# Patient Record
Sex: FEMALE | Race: WHITE | NOT HISPANIC OR LATINO | Employment: UNEMPLOYED | URBAN - METROPOLITAN AREA
[De-identification: names, ages, dates, MRNs, and addresses within clinical notes are randomized per-mention and may not be internally consistent; named-entity substitution may affect disease eponyms.]

---

## 2023-07-26 ENCOUNTER — HOSPITAL ENCOUNTER (OUTPATIENT)
Dept: DATA CONVERSION | Facility: HOSPITAL | Age: 38
End: 2023-07-26
Attending: RADIOLOGY
Payer: COMMERCIAL

## 2023-07-26 DIAGNOSIS — M21.851 OTHER SPECIFIED ACQUIRED DEFORMITIES OF RIGHT THIGH: ICD-10-CM

## 2023-07-26 DIAGNOSIS — S73.191A OTHER SPRAIN OF RIGHT HIP, INITIAL ENCOUNTER: ICD-10-CM

## 2023-07-26 DIAGNOSIS — Z98.890 OTHER SPECIFIED POSTPROCEDURAL STATES: ICD-10-CM

## 2023-10-09 PROBLEM — Q65.89 FEMORAL ANTEVERSION, CONGENITAL (HHS-HCC): Status: ACTIVE | Noted: 2023-10-09

## 2023-10-09 PROBLEM — M70.61 TROCHANTERIC BURSITIS OF BOTH HIPS: Status: ACTIVE | Noted: 2020-05-18

## 2023-10-09 PROBLEM — M51.26 HERNIATED LUMBAR INTERVERTEBRAL DISC: Status: ACTIVE | Noted: 2019-02-28

## 2023-10-09 PROBLEM — N60.11 FIBROCYSTIC BREAST CHANGES OF BOTH BREASTS: Status: ACTIVE | Noted: 2023-04-12

## 2023-10-09 PROBLEM — M46.1 SACROILIITIS (CMS-HCC): Status: ACTIVE | Noted: 2020-05-18

## 2023-10-09 PROBLEM — N60.12 FIBROCYSTIC BREAST CHANGES OF BOTH BREASTS: Status: ACTIVE | Noted: 2023-04-12

## 2023-10-09 PROBLEM — G90.A POTS (POSTURAL ORTHOSTATIC TACHYCARDIA SYNDROME): Status: ACTIVE | Noted: 2021-06-09

## 2023-10-09 PROBLEM — R92.30 DENSE BREASTS: Status: ACTIVE | Noted: 2023-04-12

## 2023-10-09 PROBLEM — M25.859 FEMOROACETABULAR IMPINGEMENT: Status: ACTIVE | Noted: 2020-07-22

## 2023-10-09 PROBLEM — M62.838 MUSCLE SPASM: Status: ACTIVE | Noted: 2020-04-20

## 2023-10-09 PROBLEM — F10.20 ALCOHOL DEPENDENCE (MULTI): Status: ACTIVE | Noted: 2023-10-09

## 2023-10-09 PROBLEM — G58.8 PUDENDAL NEURALGIA: Status: ACTIVE | Noted: 2020-05-18

## 2023-10-09 PROBLEM — N64.4 MASTODYNIA: Status: ACTIVE | Noted: 2023-04-12

## 2023-10-09 PROBLEM — Z98.1 S/P LUMBAR SPINAL FUSION: Status: ACTIVE | Noted: 2020-04-20

## 2023-10-09 PROBLEM — R87.810 CERVICAL HIGH RISK HPV (HUMAN PAPILLOMAVIRUS) TEST POSITIVE: Status: ACTIVE | Noted: 2020-12-18

## 2023-10-09 PROBLEM — F32.2 MDD (MAJOR DEPRESSIVE DISORDER), SEVERE (MULTI): Status: ACTIVE | Noted: 2022-12-22

## 2023-10-09 PROBLEM — M70.62 TROCHANTERIC BURSITIS OF BOTH HIPS: Status: ACTIVE | Noted: 2020-05-18

## 2023-10-09 PROBLEM — M25.851 FEMOROACETABULAR IMPINGEMENT OF RIGHT HIP: Status: ACTIVE | Noted: 2020-07-19

## 2023-10-09 PROBLEM — F98.8 ATTENTION DEFICIT DISORDER (ADD) WITHOUT HYPERACTIVITY: Status: ACTIVE | Noted: 2020-05-06

## 2023-10-09 PROBLEM — M48.061 LUMBAR STENOSIS: Status: ACTIVE | Noted: 2020-02-10

## 2023-10-09 PROBLEM — R92.2 DENSE BREASTS: Status: ACTIVE | Noted: 2023-04-12

## 2023-10-09 RX ORDER — METFORMIN HYDROCHLORIDE 1000 MG/1
1000 TABLET ORAL
COMMUNITY

## 2023-10-09 RX ORDER — NALTREXONE HYDROCHLORIDE 50 MG/1
50 TABLET, FILM COATED ORAL DAILY
COMMUNITY
Start: 2023-07-06 | End: 2023-10-10 | Stop reason: ALTCHOICE

## 2023-10-09 RX ORDER — MELOXICAM 7.5 MG/1
7.5 TABLET ORAL DAILY PRN
COMMUNITY
End: 2023-10-10 | Stop reason: ALTCHOICE

## 2023-10-09 RX ORDER — METHOCARBAMOL 750 MG/1
750 TABLET, FILM COATED ORAL EVERY 8 HOURS PRN
COMMUNITY
End: 2023-10-10 | Stop reason: ALTCHOICE

## 2023-10-09 RX ORDER — FAMOTIDINE 20 MG/1
20 TABLET, FILM COATED ORAL DAILY PRN
COMMUNITY

## 2023-10-09 RX ORDER — BUPROPION HYDROCHLORIDE 150 MG/1
150 TABLET ORAL
COMMUNITY
Start: 2023-06-26

## 2023-10-10 ENCOUNTER — TELEMEDICINE (OUTPATIENT)
Dept: PAIN MEDICINE | Facility: HOSPITAL | Age: 38
End: 2023-10-10
Payer: COMMERCIAL

## 2023-10-10 DIAGNOSIS — M96.1 FAILED BACK SYNDROME OF LUMBAR SPINE: Primary | ICD-10-CM

## 2023-10-10 DIAGNOSIS — M25.851 FEMOROACETABULAR IMPINGEMENT OF RIGHT HIP: ICD-10-CM

## 2023-10-10 PROCEDURE — 99204 OFFICE O/P NEW MOD 45 MIN: CPT | Performed by: ANESTHESIOLOGY

## 2023-10-10 PROCEDURE — 99214 OFFICE O/P EST MOD 30 MIN: CPT | Mod: GT | Performed by: ANESTHESIOLOGY

## 2023-10-10 ASSESSMENT — PAIN SCALES - GENERAL: PAINLEVEL: 8

## 2023-10-10 NOTE — PROGRESS NOTES
Chief Complaint   Patient presents with    Back Pain    Leg Pain    Hip Pain        HPI   David Street is a patient with a history of back pain and hip pain which has been standing.  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  She notes that she has had a total of 4 spine surgeries with Dr. Solis at [facility] which included 3 laminectomies and a L5-S1 fusion by her report.  The patient says that she has had 8 total hip surgeries the most recent of which was an arthroscopy at the end of last year.  She says that she has chronic pain which is triggering this appointment today.  When I asked about what acute means and how long her acute pain has been going, she says the last 3 years.  The patient notes that she has seen pain management and at one point was seeing Dr. 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  She underwent lumbar sympathetic blocks and did physical therapy which improved her CRPS symptoms.  Her acute on chronic pain includes her bilateral hips which is consistent with hip pain generating from the joint as well as low back, buttock, and right lower extremity pain that radiates to the foot and ankle.  The patient says that in general she has been nonfunctional for the past 10 years.  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  Of all of the treatments she notes that opioid medication helped the best and she would receive this after surgeries.  She notes that she understands why opioids are not prescribed freely for chronic pains.  She has tried ice, heat, and pain management disease, Cymbalta which she had concerns about weight gain.  Patient notes she has concerns about medication effects and weight gain because she cannot be that at to keep weight off.  She uses a walker and crutches and this for exercise.  Activity and standing  and walking can make things worse whereas rest and medications can make things better.  There is no time when she is completely pain    The patient was referred to me by Dr. Dewey and she notes that she was going to also see a second opinion in [place] they do not take her insurance.  The patient tried to do an appeal to get to see the surgeon in [place] but did not get anywhere.  Dr. Dewey reportedly told her that a replacement would be her only next surgical option.  The last note from Dr. Dewey notes that they can discuss further management and treatment after she had her second opinion which unfortunately has not come to fruition.    ROS: 13 point review of systems is complete and is negative listed above in HPI    Imaging:  Patient has no recent advanced imaging of the spine.  X-rays note her fusion but do not say levels.  Patient had recent hip imaging including x-rays and MRIs which were reviewed.    Physical Exam:  Gen.: Patient appears to be stated age, fair hygiene  Musculoskeletal: Patient with pain-pointed out areas of discomfort in the hip consistent with C-sign  Psych: Tearful at times    Impression/Plan:  37-year-old female with a history of chronic with low back  pain with radicular symptoms down the right lower extremity is well as bilateral hip pain which has been chronic.  She has Freddy-Danlos and may not be an optimal surgical candidate for replacement.  She has had 4 prior lumbar spine surgeries including 3 laminectomies most recently a lumbar spinal fusion as well as 8 hip surgeries most recently had a hip arthroscopy.  From a hip standpoint she was advised that she would be a surgical candidate only in the form of a replacement.  Due to her young age replacement at this time would be a last resort.  She has kept up with water exercise and has done formal physical therapy in the past.  She has kept up with physician directed exercise and stretching at least twice a week for her back over  the last 6 months.    -We will plan for lumbar MRI as she has had no advanced imaging since her last surgery.  She has failed to improve with conservative measures and a number of conservative treatments.  We may consider more targeted procedures directed her back and leg or possibly neuromodulation.  Spinal cord stimulation could be a potential consideration.  Depending on what her MRI shows and if appropriate this could help with her back and leg symptoms.    -For her hip pain we could consider femoral obturator sensory nerve blocks and subsequent radiofrequency ablation if the diagnostic block is helpful.  We discussed that this is often a step beyond the intra-articular steroid injections which have not been helpful and could be an alternative/bridge to pursuing replacement at this time.  Procedure, risk, benefits, alternatives reviewed with the patient and she would like to discuss this with her dad who is a physician, pediatric neurologist.    -Encourage to keep up with core strengthening and exercise program.    -A chronic pain program, cognitive behavioral therapy, and ketamine infusions may also be helpful for this patient due to the long and chronic nature of her pains.

## 2023-11-30 ENCOUNTER — HOSPITAL ENCOUNTER (OUTPATIENT)
Facility: HOSPITAL | Age: 38
Setting detail: OUTPATIENT SURGERY
End: 2023-11-30
Attending: ORTHOPAEDIC SURGERY | Admitting: ORTHOPAEDIC SURGERY
Payer: COMMERCIAL

## 2023-11-30 DIAGNOSIS — M16.11 PRIMARY OSTEOARTHRITIS OF RIGHT HIP: ICD-10-CM

## 2024-01-31 ENCOUNTER — APPOINTMENT (OUTPATIENT)
Dept: RADIOLOGY | Facility: CLINIC | Age: 39
End: 2024-01-31
Payer: COMMERCIAL

## 2024-04-02 ENCOUNTER — APPOINTMENT (OUTPATIENT)
Dept: PRIMARY CARE | Facility: CLINIC | Age: 39
End: 2024-04-02
Payer: COMMERCIAL

## 2024-04-29 ENCOUNTER — TELEPHONE (OUTPATIENT)
Dept: PRIMARY CARE | Facility: CLINIC | Age: 39
End: 2024-04-29
Payer: COMMERCIAL

## 2024-05-21 ENCOUNTER — APPOINTMENT (OUTPATIENT)
Dept: RADIOLOGY | Facility: CLINIC | Age: 39
End: 2024-05-21
Payer: COMMERCIAL

## 2024-05-22 ENCOUNTER — HOSPITAL ENCOUNTER (OUTPATIENT)
Dept: RADIOLOGY | Facility: CLINIC | Age: 39
Discharge: HOME | End: 2024-05-22
Payer: COMMERCIAL

## 2024-05-22 ENCOUNTER — OFFICE VISIT (OUTPATIENT)
Dept: ORTHOPEDIC SURGERY | Facility: CLINIC | Age: 39
End: 2024-05-22
Payer: COMMERCIAL

## 2024-05-22 VITALS — HEIGHT: 64 IN | BODY MASS INDEX: 21.17 KG/M2 | WEIGHT: 124 LBS

## 2024-05-22 DIAGNOSIS — M25.551 PAIN IN RIGHT HIP: Primary | ICD-10-CM

## 2024-05-22 DIAGNOSIS — M25.551 PAIN IN RIGHT HIP: ICD-10-CM

## 2024-05-22 PROCEDURE — 72170 X-RAY EXAM OF PELVIS: CPT | Performed by: RADIOLOGY

## 2024-05-22 PROCEDURE — 99214 OFFICE O/P EST MOD 30 MIN: CPT | Performed by: ORTHOPAEDIC SURGERY

## 2024-05-22 PROCEDURE — 72170 X-RAY EXAM OF PELVIS: CPT

## 2024-05-22 RX ORDER — METOPROLOL SUCCINATE 25 MG/1
25 TABLET, EXTENDED RELEASE ORAL DAILY
COMMUNITY
Start: 2024-03-29

## 2024-05-22 RX ORDER — DULOXETIN HYDROCHLORIDE 30 MG/1
CAPSULE, DELAYED RELEASE ORAL
COMMUNITY
Start: 2024-04-03

## 2024-05-22 ASSESSMENT — PAIN DESCRIPTION - DESCRIPTORS: DESCRIPTORS: SHARP;RADIATING

## 2024-05-22 ASSESSMENT — PAIN SCALES - GENERAL: PAINLEVEL_OUTOF10: 5 - MODERATE PAIN

## 2024-05-22 ASSESSMENT — PAIN - FUNCTIONAL ASSESSMENT: PAIN_FUNCTIONAL_ASSESSMENT: 0-10
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
## 2024-06-18 ENCOUNTER — EVALUATION (OUTPATIENT)
Dept: PHYSICAL THERAPY | Facility: CLINIC | Age: 39
End: 2024-06-18
Payer: COMMERCIAL

## 2024-06-18 DIAGNOSIS — M25.551 PAIN IN RIGHT HIP: ICD-10-CM

## 2024-06-18 PROCEDURE — 97110 THERAPEUTIC EXERCISES: CPT | Mod: GP | Performed by: PHYSICAL THERAPIST

## 2024-06-18 PROCEDURE — 97140 MANUAL THERAPY 1/> REGIONS: CPT | Mod: GP | Performed by: PHYSICAL THERAPIST

## 2024-06-18 PROCEDURE — 97161 PT EVAL LOW COMPLEX 20 MIN: CPT | Mod: GP | Performed by: PHYSICAL THERAPIST

## 2024-06-18 ASSESSMENT — ENCOUNTER SYMPTOMS
OCCASIONAL FEELINGS OF UNSTEADINESS: 0
LOSS OF SENSATION IN FEET: 0
DEPRESSION: 0
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
## 2024-06-27 ENCOUNTER — TREATMENT (OUTPATIENT)
Dept: PHYSICAL THERAPY | Facility: CLINIC | Age: 39
End: 2024-06-27
Payer: COMMERCIAL

## 2024-06-27 DIAGNOSIS — M25.551 PAIN IN RIGHT HIP: Primary | ICD-10-CM

## 2024-06-27 PROCEDURE — 97140 MANUAL THERAPY 1/> REGIONS: CPT | Mod: GP | Performed by: PHYSICAL THERAPIST

## 2024-06-27 PROCEDURE — 97110 THERAPEUTIC EXERCISES: CPT | Mod: GP | Performed by: PHYSICAL THERAPIST
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
## 2024-07-05 ENCOUNTER — TREATMENT (OUTPATIENT)
Dept: PHYSICAL THERAPY | Facility: CLINIC | Age: 39
End: 2024-07-05
Payer: COMMERCIAL

## 2024-07-05 DIAGNOSIS — M25.551 PAIN IN RIGHT HIP: ICD-10-CM

## 2024-07-05 PROCEDURE — 97140 MANUAL THERAPY 1/> REGIONS: CPT | Mod: GP | Performed by: PHYSICAL THERAPIST

## 2024-07-05 PROCEDURE — 97110 THERAPEUTIC EXERCISES: CPT | Mod: GP | Performed by: PHYSICAL THERAPIST
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
## 2024-07-11 ENCOUNTER — APPOINTMENT (OUTPATIENT)
Dept: PHYSICAL THERAPY | Facility: CLINIC | Age: 39
End: 2024-07-11
Payer: COMMERCIAL

## 2024-07-15 ENCOUNTER — TREATMENT (OUTPATIENT)
Dept: PHYSICAL THERAPY | Facility: CLINIC | Age: 39
End: 2024-07-15
Payer: COMMERCIAL

## 2024-07-15 DIAGNOSIS — M25.551 PAIN IN RIGHT HIP: ICD-10-CM

## 2024-07-15 PROCEDURE — 97110 THERAPEUTIC EXERCISES: CPT | Mod: GP | Performed by: PHYSICAL THERAPIST

## 2024-07-15 PROCEDURE — 97140 MANUAL THERAPY 1/> REGIONS: CPT | Mod: GP | Performed by: PHYSICAL THERAPIST
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
## 2024-07-23 ENCOUNTER — TREATMENT (OUTPATIENT)
Dept: PHYSICAL THERAPY | Facility: CLINIC | Age: 39
End: 2024-07-23
Payer: COMMERCIAL

## 2024-07-23 DIAGNOSIS — M25.551 PAIN IN RIGHT HIP: ICD-10-CM

## 2024-07-23 PROCEDURE — 97140 MANUAL THERAPY 1/> REGIONS: CPT | Mod: GP | Performed by: PHYSICAL THERAPIST

## 2024-07-23 PROCEDURE — 97110 THERAPEUTIC EXERCISES: CPT | Mod: GP | Performed by: PHYSICAL THERAPIST
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
## 2024-07-30 ENCOUNTER — TREATMENT (OUTPATIENT)
Dept: PHYSICAL THERAPY | Facility: CLINIC | Age: 39
End: 2024-07-30
Payer: COMMERCIAL

## 2024-07-30 DIAGNOSIS — M25.551 PAIN IN RIGHT HIP: ICD-10-CM

## 2024-07-30 PROCEDURE — 97140 MANUAL THERAPY 1/> REGIONS: CPT | Mod: GP | Performed by: PHYSICAL THERAPIST

## 2024-07-30 PROCEDURE — 97110 THERAPEUTIC EXERCISES: CPT | Mod: GP | Performed by: PHYSICAL THERAPIST
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
## 2024-11-20 ENCOUNTER — HOSPITAL ENCOUNTER (OUTPATIENT)
Dept: RADIOLOGY | Facility: CLINIC | Age: 39
Discharge: HOME | End: 2024-11-20
Payer: COMMERCIAL

## 2024-11-20 ENCOUNTER — OFFICE VISIT (OUTPATIENT)
Dept: ORTHOPEDIC SURGERY | Facility: CLINIC | Age: 39
End: 2024-11-20
Payer: COMMERCIAL

## 2024-11-20 DIAGNOSIS — M25.551 PAIN IN RIGHT HIP: ICD-10-CM

## 2024-11-20 DIAGNOSIS — M25.551 PAIN IN RIGHT HIP: Primary | ICD-10-CM

## 2024-11-20 PROCEDURE — 72190 X-RAY EXAM OF PELVIS: CPT

## 2024-11-20 PROCEDURE — 99214 OFFICE O/P EST MOD 30 MIN: CPT | Performed by: ORTHOPAEDIC SURGERY

## 2024-11-20 RX ORDER — CELECOXIB 100 MG/1
100 CAPSULE ORAL 2 TIMES DAILY
Qty: 60 CAPSULE | Refills: 1 | Status: SHIPPED | OUTPATIENT
Start: 2024-11-20 | End: 2025-01-19

## 2024-11-20 ASSESSMENT — PAIN - FUNCTIONAL ASSESSMENT: PAIN_FUNCTIONAL_ASSESSMENT: 0-10

## 2024-11-20 ASSESSMENT — PAIN SCALES - GENERAL: PAINLEVEL_OUTOF10: 6

## 2024-11-20 ASSESSMENT — PAIN DESCRIPTION - DESCRIPTORS: DESCRIPTORS: THROBBING;STABBING

## 2024-11-21 NOTE — PROGRESS NOTES
38-year-old female following up again regarding her right hip primarily but really bilateral hips lower back sacroiliac pain and dysfunction.  Of note her for quite some time now.  She was not ready for proceeding with any elective arthroplasty type surgery several months ago when I saw her last in May.  She presents today with a male .  She was recently started on Cymbalta but has stopped that.  She describes pain in her groins bilaterally but also significantly in her lower back and sacroiliac region.  She is a previous L5-S1 fusion done at the [facility].  She has not had any sort of diagnostic injections in her back.  She has had injections in her hip in the remote past that were temporarily relieving.  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    The patient does not endorse fevers and chills. The patient does not endorse any change in her vision or hearing. They do not endorse chest pain, shortness of breath. The patient does not endorse any abdominal discomfort. They do not endorse any skin irritation or lesions. They do not endorse any new numbness and tingling or as otherwise stated in the history of present illness.    She is in no acute distress, alert and oriented x 3.    Mood and affect are appropriate.    Respirations are unlabored.    Distal limb is pink and well perfused.    Right lower extremity evaluation demonstrates a markedly globally inflamed hip.  She was quite tender just with gentle passive flexion as well as internal/external rotation.  Has a positive anterior impingement test.  Sensation is intact to light touch in the tibial, sural, saphenous, superficial peroneal, and deep peroneal nerve distributions. Foot is warm and well-perfused.    X-rays reviewed that demonstrate previous bilateral periacetabular osteotomies.  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  She also is a previous L5-S1 fusion.    38-year-old female with a complex  pain history.  I do not think she has ever seen pain management here at .  I like her to get established so that we can get some diagnostic injections for process of illumination prior to proceeding with arthroplasty at such a young age with a connective tissue disease.  She is very high risk for dislocation and instability postoperatively.  I explained this to her in great detail.  I would like to send her for sacroiliac injections as well as lumbar epidural steroid injections to see if she has any pain relief from that.  I also like to have her be evaluated by a spine provider in light of her previous surgery and continued lower back pain.  I just want to feel safe and secure moving forward with arthroplasty ruling out all her sources of pain or at least giving her more accurate depiction of what percentage of her pain would be treated by an elective operation like arthroplasty especially being not even 41 years old.  I will see her back or she can call the office after these tests have been completed.    Natural History reviewed. All questions answered. The patient was in agreement with the plan.      **This note was created using voice recognition software and was not corrected for typographical or grammatical errors.**

## 2024-12-16 ENCOUNTER — TRANSCRIBE ORDERS (OUTPATIENT)
Dept: ORTHOPEDIC SURGERY | Facility: HOSPITAL | Age: 39
End: 2024-12-16
Payer: COMMERCIAL

## 2024-12-16 DIAGNOSIS — M54.50 LOW BACK PAIN, UNSPECIFIED BACK PAIN LATERALITY, UNSPECIFIED CHRONICITY, UNSPECIFIED WHETHER SCIATICA PRESENT: ICD-10-CM

## 2024-12-17 ENCOUNTER — APPOINTMENT (OUTPATIENT)
Dept: ORTHOPEDIC SURGERY | Facility: CLINIC | Age: 39
End: 2024-12-17
Payer: COMMERCIAL

## 2024-12-17 ENCOUNTER — APPOINTMENT (OUTPATIENT)
Dept: RADIOLOGY | Facility: CLINIC | Age: 39
End: 2024-12-17
Payer: COMMERCIAL

## 2024-12-24 ENCOUNTER — APPOINTMENT (OUTPATIENT)
Dept: PAIN MEDICINE | Facility: HOSPITAL | Age: 39
End: 2024-12-24
Payer: COMMERCIAL

## 2025-01-07 ENCOUNTER — OFFICE VISIT (OUTPATIENT)
Dept: PAIN MEDICINE | Facility: HOSPITAL | Age: 40
End: 2025-01-07
Payer: COMMERCIAL

## 2025-01-07 DIAGNOSIS — M25.551 PAIN IN RIGHT HIP: ICD-10-CM

## 2025-01-07 DIAGNOSIS — M96.1 FAILED BACK SURGICAL SYNDROME: Primary | ICD-10-CM

## 2025-01-07 DIAGNOSIS — M25.851 FEMOROACETABULAR IMPINGEMENT OF RIGHT HIP: ICD-10-CM

## 2025-01-07 PROCEDURE — 99214 OFFICE O/P EST MOD 30 MIN: CPT | Performed by: ANESTHESIOLOGY

## 2025-01-07 ASSESSMENT — PAIN SCALES - GENERAL: PAINLEVEL_OUTOF10: 7

## 2025-01-07 NOTE — PROGRESS NOTES
Chief Complaint   Patient presents with    Back Pain    Hip Pain       HPI  David Street is a 39-year-old female with a history of 4 previous lumbar surgeries, most recently a L5-S1 fusion. Has also had several hip surgeries, including bilateral periacetabular osteotomies. She has had injections in her hip in the remote past that were temporarily relieving - last one being 3 years ago. She has a connective tissue disease confirmed with a diagnosis of Freddy-Danlos. Referred by Dr. Dewey for possible SI injection vs LESI.  She does have a good amount of back pain that is centrally located around the L5-S1 level.  Her surgeon is concerned that we are not treating other pain generators and due to her young age and other conditions, he would like to exhaust all conservative measures and explore other etiologies for pain generators before pursuing hip replacements at 39 years of age.    Today, the patient is reporting non-radiating low back pain following her L5-S1 fusion as well as bilateral hip pain. Pain in the back is low, midline, occasionally radiating across the belt line. Pain in the hips wraps from posterior to anterior around lateral thigh/pelvis. Described as stabbing. Not associated numbness or tingling. She does endorse weakness but attributes this to feeling unstable 2/2 EDS. Worse with laying flat bending, and prolonged sitting. Continues to do back exercises and home PT daily, as well as swim 3-4 times a week. The patient says that in general she is non-functional and unable to complete ADLs. She has tried ice, heat, and pain management disease, Cymbalta. Currently taking Celebrex only, with no benefit.     Of note, the patient did see her spine surgeon and he did get plain films which did not show any significant pathology but only can show her fusion and bone.  She has not had any advanced imaging of the lumbar spine since her last surgery.      Review of Systems   13-point ROS done and negative  except for HPI.       Current Outpatient Medications:     buPROPion XL (Wellbutrin XL) 150 mg 24 hr tablet, Take 1 tablet (150 mg) by mouth once daily., Disp: , Rfl:     celecoxib (CeleBREX) 100 mg capsule, Take 1 capsule (100 mg) by mouth 2 times a day., Disp: 60 capsule, Rfl: 1    DULoxetine (Cymbalta) 30 mg DR capsule, Takes  2 capsules daily, Disp: , Rfl:     famotidine (Pepcid) 20 mg tablet, Take 1 tablet (20 mg) by mouth once daily as needed., Disp: , Rfl:     metFORMIN (Glucophage) 1,000 mg tablet, Take 1 tablet (1,000 mg) by mouth once daily., Disp: , Rfl:     metoprolol succinate XL (Toprol-XL) 25 mg 24 hr tablet, Take 1 tablet (25 mg) by mouth once daily., Disp: , Rfl:     multivitamin capsule, Take 1 capsule by mouth once daily., Disp: , Rfl:      No past medical history on file.     No past surgical history on file.     Family History   Problem Relation Name Age of Onset    Alcohol abuse Mother      Alcohol abuse Maternal Grandmother          Allergies   Allergen Reactions    Duloxetine GI intolerance and GI Upset     Dizziness and blurred vision    Gabapentin GI intolerance and Unknown     Dizziness and blurred vision    Miconazole Swelling     Other Reaction(s): Pain    Tramadol Nausea/vomiting     Other Reaction(s): Vomiting    Sulfamethoxazole-Trimethoprim Diarrhea, GI intolerance, GI Upset and Nausea/vomiting     Other Reaction(s): GI Intolerance    Tioconazole Swelling     Vaginal swelling and pain    Other Reaction(s): Other: See Comments      Vaginal swelling and pain        Objective     There were no vitals filed for this visit.     Physical Exam    GENERAL EXAM  Vital Signs: Vital signs to include heart rate, respiration rate, blood pressure, and temperature were reviewed.  General Appearance:  Awake, alert, healthy appearing, well developed, No acute distress.  Head: Normocephalic without evidence of head injury.  Neck: The appearance of the neck was normal without swelling with a midline  trachea.  Eyes: The eyelids and eyebrows exhibited no abnormalities.  Pupils were not pin-point.  Sclera was without icterus.  Lungs: Respiration rhythm and depth was normal.  Respiratory movements were normal without labored breathing.  Cardiovascular: No peripheral edema was present.    Neurological: Patient was oriented to time, place, and person.  Speech was normal.  Balance, gait, and stance were unremarkable.    MSK/Back: 5/5 strength in LE BL, midline sacrum TTP  Psychiatric: Appearance was normal with appropriate dress.  Mood was euthymic and affect was normal.  Skin: Affected regions were without ecchymosis or skin lesions.     XR LUMBAR 4V AP/LAT/ FLEX/EXT Dec 11 2024  4:46PM   Again seen to have had L5-S1 anterior posterior fusion with posterior   decompression.  The construct is similar in appearance without signs of   loosening or failure.  Underlying disc space narrowing and minimal   retrolisthesis unchanged.     Minimal apex leftward curvature centered at L3-4 unchanged.  Disc heights   are maintained.     With flexion and extension there is no radiographically demonstrated   instability.     XR Pelvis   No acute fracture or dislocation is evident. There is prior right  periacetabular osteotomy with osseous bridging overall possibly with  some persistent visualization of the osteotomy of the root of the  superior pubic ramus.. There is left periacetabular osteotomy which  appears to have osseous bridging overall with some persistent  visualization of the vertical osteotomy. Surgical changes seen of the  spine.    Assessment/Plan   39-year-old female with a history of chronic with low back pain (previously c/o radicular sx which have resolved) as well as bilateral hip pain which has been chronic. She has Freddy-Danlos and was referred by Dr. Dewey to exhaust less invasive interventions before proceeding w/ BL hip arthroplasty. She has had 4 prior lumbar spine surgeries including 3 laminectomies most  recently a lumbar spinal fusion at L5-S1 as well as 8 hip surgeries, including BL osteotomies. She has kept up with water exercise and has done formal physical therapy in the past. She has kept up with physician directed exercise and stretching daily for her back.    I agree at this time and all of her pain can be explained from her hips though that does also seem to be a pain generator.  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      Plan  -We will plan for lumbar MRI as she has had no advanced imaging since her last surgery.  She has failed to improve with conservative measures and a number of conservative treatments.  She did a full course of physical therapy for her low back and has been keeping up with Joce based exercises 3-4 times a week ongoing for the past 6 months or more.    - For her hip pain we could consider femoral obturator sensory nerve blocks and subsequent radiofrequency ablation if the diagnostic block is helpful. Procedure, risk, benefits, alternatives reviewed with the patient who was agreeable to proceed.

## 2025-01-22 ENCOUNTER — APPOINTMENT (OUTPATIENT)
Facility: HOSPITAL | Age: 40
End: 2025-01-22
Payer: COMMERCIAL

## 2025-01-22 ENCOUNTER — HOSPITAL ENCOUNTER (OUTPATIENT)
Dept: RADIOLOGY | Facility: HOSPITAL | Age: 40
Discharge: HOME | End: 2025-01-22
Payer: COMMERCIAL

## 2025-01-22 DIAGNOSIS — M25.551 PAIN IN RIGHT HIP: ICD-10-CM

## 2025-01-22 PROCEDURE — 72148 MRI LUMBAR SPINE W/O DYE: CPT

## 2025-01-22 PROCEDURE — 72148 MRI LUMBAR SPINE W/O DYE: CPT | Performed by: RADIOLOGY

## 2025-01-23 ENCOUNTER — APPOINTMENT (OUTPATIENT)
Dept: PAIN MEDICINE | Facility: HOSPITAL | Age: 40
End: 2025-01-23
Payer: COMMERCIAL

## 2025-01-25 NOTE — H&P
Pain Management H&P    History Of Present Illness  David Street is a 39 y.o. female presents for procedure state below. Endorses no changes in past medical history or medical health since last seen in clinic.      Past Medical History  She has no past medical history on file.    Surgical History  She has no past surgical history on file.     Social History  She reports that she has been smoking cigarettes. She started smoking about 25 years ago. She has a 12.5 pack-year smoking history. She has never used smokeless tobacco. She reports current alcohol use of about 20.0 standard drinks of alcohol per week. She reports that she does not use drugs.    Family History  Family History   Problem Relation Name Age of Onset    Alcohol abuse Mother      Alcohol abuse Maternal Grandmother          Allergies  Duloxetine, Gabapentin, Miconazole, Tramadol, Sulfamethoxazole-trimethoprim, and Tioconazole    Review of Symptoms:   Constitutional: Negative for chills, diaphoresis or fever  HENT: Negative for neck swelling  Eyes:.  Negative for eye pain  Respiratory:.  Negative for cough, shortness of breath or wheezing    Cardiovascular:.  Negative for chest pain or palpitations  Gastrointestinal:.  Negative for abdominal pain, nausea and vomiting  Genitourinary:.  Negative for urgency  Musculoskeletal:  Positive for back, hip pain. Positive for joint pain. Denies falls within the past 3 months.  Skin: Negative for wounds or itching   Neurological: Negative for dizziness, seizures, loss of consciousness and weakness  Endo/Heme/Allergies: Does not bruise/bleed easily  Psychiatric/Behavioral: Negative for depression. The patient does not appear anxious.      Pre-sedation Evaluation  ASA class 2  Mallampati score 2     PHYSICAL EXAM  Vitals signs reviewed  Constitutional:       General: Not in acute distress     Appearance: Normal appearance. Not ill-appearing.  HENT:     Head: Normocephalic and atraumatic  Eyes:     Conjunctiva/sclera:  Conjunctivae normal  Cardiovascular:     Rate and Rhythm: Normal rate and regular rhythm  Pulmonary:     Effort: No respiratory distress  Abdominal:     Palpations: Abdomen is soft  Musculoskeletal: VARMA  Skin:     General: Skin is warm and dry  Neurological:     General: No focal deficit present  Psychiatric:         Mood and Affect: Mood normal         Behavior: Behavior normal     Last Recorded Vitals  There were no vitals taken for this visit.    Relevant Results  Current Outpatient Medications   Medication Instructions    buPROPion XL (WELLBUTRIN XL) 150 mg, oral, Daily RT    DULoxetine (Cymbalta) 30 mg DR capsule Takes  2 capsules daily    famotidine (PEPCID) 20 mg, oral, Daily PRN    metFORMIN (GLUCOPHAGE) 1,000 mg, oral, Daily RT    metoprolol succinate XL (TOPROL-XL) 25 mg, oral, Daily    multivitamin capsule 1 capsule, oral, Daily         MR lumbar spine wo IV contrast 01/22/2025    Narrative  Interpreted By:  Suhail Carlos,  STUDY:  MR LUMBAR SPINE WO IV CONTRAST;  1/22/2025 6:44 pm    INDICATION:  Signs/Symptoms:pain. ,M25.551 Pain in right hip    COMPARISON:  None.    ACCESSION NUMBER(S):  [id number]    ORDERING CLINICIAN:  DANIELA ALVARADO    TECHNIQUE:  The lumbar spine was studied in the sagital, axial and coronal planes  utiliing T1 and T2 weighted images.    FINDINGS:  The marrow signal and vertebral body height are normal. The conus and  sacrum are normal. Images at each interspace reveal the following:  T12/L1  There is normal alignment and vertebral body height. The disc space  is normal. There is no evidence of canal or foraminal narrowing.  There is no evidence of bulging or herniated disc. L1/L2  There is normal alignment and vertebral body height. The disc space  is normal. There is no evidence of canal or foraminal narrowing.  There is no evidence of bulging or herniated disc. L2/L3  There is normal alignment and vertebral body height. The disc space  is normal. There is no evidence of  canal or foraminal narrowing.  There is no evidence of bulging or herniated disc. L3/L4  There is normal alignment and vertebral body height. The disc space  is normal. There is no evidence of canal or foraminal narrowing.  There is no evidence of bulging or herniated disc. L4/L5  There is normal alignment and vertebral body height. The disc space  is normal. There is no evidence of canal or foraminal narrowing.  There is no evidence of bulging or herniated disc. L5/S1  Previous laminectomy and discectomy with pedicle screw and plate  fixation as well as interbody fusion. Alignment is normal. No  evidence of pseudoarthrosis. No residual canal stenosis. There is a 5  mm x 15 mm soft tissue structure in the lateral recess on the right  consistent with granulation tissue. Residual or recurrent disc not  excluded. Secondary narrowing of the right lateral recess and neural  foramen.    Impression  * Postoperative changes as described  *There is a 5 mm x 15 mm soft tissue structure in the lateral recess  at L5/S1 on the right that can not be further characterized and is  consistent with residual or recurrent disc herniation, granulation  tissue or seroma.    MACRO:  none    Signed by: Suhail Carlos 1/23/2025 8:52 AM  Dictation workstation:   [id number]     No image results found.       No diagnosis found.     ASSESSMENT/PLAN  David Street is a 39 y.o. female here for right femoral obturator sensory nerve blocks    Patient denies any recent antibiotic use or infections, denies any blood thinner use, and denies contrast or local anesthetic allergies     Risks, benefits, alternatives discussed. All questions answered to the best of my ability. Patient agrees to proceed.      Our plan is as follows:  - Proceed with aforementioned procedure          Jim Soriano DO   Pain fellow

## 2025-01-27 ENCOUNTER — APPOINTMENT (OUTPATIENT)
Facility: HOSPITAL | Age: 40
End: 2025-01-27
Payer: COMMERCIAL

## 2025-01-27 ENCOUNTER — HOSPITAL ENCOUNTER (OUTPATIENT)
Facility: HOSPITAL | Age: 40
Discharge: HOME | End: 2025-01-27
Payer: COMMERCIAL

## 2025-01-27 VITALS
RESPIRATION RATE: 16 BRPM | HEART RATE: 77 BPM | OXYGEN SATURATION: 97 % | TEMPERATURE: 97.7 F | SYSTOLIC BLOOD PRESSURE: 112 MMHG | DIASTOLIC BLOOD PRESSURE: 76 MMHG

## 2025-01-27 DIAGNOSIS — M25.551 PAIN IN RIGHT HIP: ICD-10-CM

## 2025-01-27 PROCEDURE — 3700000012 HC SEDATION LEVEL 5+ TIME - INITIAL 15 MINUTES 5/> YEARS

## 2025-01-27 PROCEDURE — 2550000001 HC RX 255 CONTRASTS: Performed by: ANESTHESIOLOGY

## 2025-01-27 PROCEDURE — 2500000004 HC RX 250 GENERAL PHARMACY W/ HCPCS (ALT 636 FOR OP/ED): Performed by: ANESTHESIOLOGY

## 2025-01-27 PROCEDURE — 64450 NJX AA&/STRD OTHER PN/BRANCH: CPT | Performed by: ANESTHESIOLOGY

## 2025-01-27 PROCEDURE — 64450 NJX AA&/STRD OTHER PN/BRANCH: CPT | Mod: RT | Performed by: ANESTHESIOLOGY

## 2025-01-27 PROCEDURE — 77002 NEEDLE LOCALIZATION BY XRAY: CPT | Performed by: ANESTHESIOLOGY

## 2025-01-27 PROCEDURE — 64447 NJX AA&/STRD FEMORAL NRV IMG: CPT | Performed by: ANESTHESIOLOGY

## 2025-01-27 RX ORDER — LIDOCAINE HYDROCHLORIDE 20 MG/ML
INJECTION, SOLUTION EPIDURAL; INFILTRATION; INTRACAUDAL; PERINEURAL
Status: DISPENSED
Start: 2025-01-27 | End: 2025-01-27

## 2025-01-27 RX ORDER — LIDOCAINE HYDROCHLORIDE 20 MG/ML
INJECTION, SOLUTION EPIDURAL; INFILTRATION; INTRACAUDAL; PERINEURAL
Status: COMPLETED | OUTPATIENT
Start: 2025-01-27 | End: 2025-01-27

## 2025-01-27 RX ORDER — MIDAZOLAM HYDROCHLORIDE 1 MG/ML
INJECTION INTRAMUSCULAR; INTRAVENOUS
Status: DISPENSED
Start: 2025-01-27 | End: 2025-01-27

## 2025-01-27 RX ORDER — LIDOCAINE HYDROCHLORIDE 5 MG/ML
INJECTION, SOLUTION INFILTRATION; INTRAVENOUS
Status: DISPENSED
Start: 2025-01-27 | End: 2025-01-27

## 2025-01-27 RX ORDER — LIDOCAINE HYDROCHLORIDE 5 MG/ML
INJECTION, SOLUTION INFILTRATION; INTRAVENOUS
Status: COMPLETED | OUTPATIENT
Start: 2025-01-27 | End: 2025-01-27

## 2025-01-27 RX ORDER — MIDAZOLAM HYDROCHLORIDE 1 MG/ML
INJECTION, SOLUTION INTRAMUSCULAR; INTRAVENOUS
Status: COMPLETED | OUTPATIENT
Start: 2025-01-27 | End: 2025-01-27

## 2025-01-27 RX ADMIN — LIDOCAINE HYDROCHLORIDE 1 ML: 20 INJECTION, SOLUTION EPIDURAL; INFILTRATION; INTRACAUDAL; PERINEURAL at 08:32

## 2025-01-27 RX ADMIN — MIDAZOLAM 2 MG: 1 INJECTION INTRAMUSCULAR; INTRAVENOUS at 08:19

## 2025-01-27 RX ADMIN — LIDOCAINE HYDROCHLORIDE 3 ML: 5 INJECTION, SOLUTION INFILTRATION at 08:20

## 2025-01-27 RX ADMIN — IOHEXOL 1 ML: 240 INJECTION, SOLUTION INTRATHECAL; INTRAVASCULAR; INTRAVENOUS; ORAL at 08:31

## 2025-01-27 ASSESSMENT — PAIN SCALES - GENERAL
PAINLEVEL_OUTOF10: 6
PAINLEVEL_OUTOF10: 3
PAINLEVEL_OUTOF10: 5 - MODERATE PAIN
PAINLEVEL_OUTOF10: 5 - MODERATE PAIN
PAINLEVEL_OUTOF10: 0 - NO PAIN
PAINLEVEL_OUTOF10: 6

## 2025-01-27 ASSESSMENT — PAIN - FUNCTIONAL ASSESSMENT
PAIN_FUNCTIONAL_ASSESSMENT: WONG-BAKER FACES
PAIN_FUNCTIONAL_ASSESSMENT: 0-10

## 2025-01-27 ASSESSMENT — COLUMBIA-SUICIDE SEVERITY RATING SCALE - C-SSRS
6. HAVE YOU EVER DONE ANYTHING, STARTED TO DO ANYTHING, OR PREPARED TO DO ANYTHING TO END YOUR LIFE?: NO
2. HAVE YOU ACTUALLY HAD ANY THOUGHTS OF KILLING YOURSELF?: NO
1. IN THE PAST MONTH, HAVE YOU WISHED YOU WERE DEAD OR WISHED YOU COULD GO TO SLEEP AND NOT WAKE UP?: NO

## 2025-01-27 NOTE — DISCHARGE INSTRUCTIONS
DISCHARGE INSTRUCTIONS FOR INJECTIONS     You underwent right-sided femoral and obturator sensory nerve blocks today    After most injections, it is recommended that you relax and limit your activity for the remainder of the day unless you have been told otherwise by your pain physician.  You should not drive a car, operate machinery, or make important legal decisions unless otherwise directed by your pain physician.  You may resume your normal activity, including exercise, tomorrow.      Keep a written pain diary of how much pain relief you experienced following the injection procedure and the length of time of pain relief you experienced pain relief. Following diagnostic injections like medial branch nerve blocks, sacroiliac joint blocks, stellate ganglion injections and other blocks, it is very important you record the specific amount of pain relief you experienced immediately after the injectionand how long it lasted. Your doctor will ask you for this information at your follow up visit.     For all injections, please keep the injection site dry and inspect the site for a couple of days. You may remove the Band-Aid the day of the injection at any time.     Some discomfort, bruising or slight swelling may occur at the injection site. This is not abnormal if it occurs.  If needed you may:    -Take over the counter medication such as Tylenol or Motrin.   -Apply an ice pack for 30 minutes, 2 to 3 times a day for the first 24 hours.     You may shower today; no soaking baths, hot tubs, whirlpools or swimming pools for two days.      If you are given steroids in your injection, it may take 3-5 days for the steroid medication to take effect. You may notice a worsening of your symptoms for 1-2 days after the injection. This is not abnormal.  You may use acetaminophen, ibuprofen, or prescription medication that your doctor may have prescribed for you if you need to do so.     A few common side effects of steroids include  facial flushing, sweating, restlessness, irritability,difficulty sleeping, increase in blood sugar, and increased blood pressure. If you have diabetes, please monitor your blood sugar at least once a day for at least 5 days. If you have poorly controlled high blood pressure, monitoryour blood pressure for at least 2 days and contact your primary care physician if these numbers are unusually high for you.      If you take aspirin or non-steroidal anti-inflammatory drugs (examples are Motrin, Advil, ibuprofen, Naprosyn, Voltaren, Relafen, etc.) you may restart these this evening, but stop taking it 3 days before your next appointment, unless instructed otherwiseby your physician.      You do not need to discontinue non-aspirin-containing pain medications prior to an injection (examples: Celebrex, tramadol, hydrocodone and acetaminophen).      If you take a blood thinning medication (Coumadin, Lovenox, Fragmin,Ticlid, Plavix, Pradaxa, etc.), please discuss this with your primary care physician/cardiologist and your pain physician. These medications MUST be discontinued before you can have an injection safely, without the risk of uncontrolled bleeding. If these medications are not discontinued for an appropriate period of time, you will not be able to receivean injection. Please adhere to instructions given to you about when to restart your blood thinning medication. If you have any questions please reach out to our team.    If you are taking Coumadin, please have your INR checked the morning of your procedure and bring the result to your appointment unless otherwise instructed. If your INR is over 1.2, your injection may need to be rescheduled to avoid uncontrolled bleeding from the needle placement.     Call [facility]  and ask for Pain Management at [phone] between 8am-4pm Monday - Friday if you are experiencing the following:    If you received an epidural or spinal injection:    -Headache that doesnot go away  with medicine, is worse when sitting or standing up, and is greatly relieved upon lying down.   -Severe pain worse than or different than your baseline pain.   -Chills or fever (101º F or greater).   -Drainage or signs of infection at the injection site     Go directly to the Emergency Department if you are experiencing the following and received an epidural or spinal injection:   -Abrupt weakness or progressive weakness in your legs that starts after you leave the clinic.   -Abrupt severe or worsening numbness in your legs.   -Inability to urinate after the injection or loss of bowel or bladder control without the urge to defecate or urinate.     If you have a clinical question that cannot wait until your next appointment, please call [phone] between 8am-4pm Monday - Friday or send a [employer] message. We do our best to return all non-emergency messages within 24 hours, Monday - Friday. A nurse or physician will return your message. You may also try calling Dr. Joe Beck's nurse ([phone]), and they will do their best to answer your question(s).    If you need to cancel an appointment, please call the scheduling staff at [phone] during normal business hours or leave a message at least 24 hours in advance.     If you are going to be sedated for your next procedure, you MUST have responsible adult who can legally drive accompany you home. You cannot eat or drink for at least eight hours prior to the planned procedure if you are going to receive sedation. You may take your non-blood thinning medications with a small sip of water.

## 2025-01-29 ENCOUNTER — APPOINTMENT (OUTPATIENT)
Dept: ORTHOPEDIC SURGERY | Facility: CLINIC | Age: 40
End: 2025-01-29
Payer: COMMERCIAL

## 2025-01-30 DIAGNOSIS — M54.16 LUMBAR RADICULOPATHY: ICD-10-CM

## 2025-02-05 ENCOUNTER — APPOINTMENT (OUTPATIENT)
Dept: ORTHOPEDIC SURGERY | Facility: CLINIC | Age: 40
End: 2025-02-05
Payer: COMMERCIAL

## 2025-02-20 NOTE — H&P
Pain Management H&P    History Of Present Illness  David Street is a 39 y.o. female presents for procedure state below. Endorses no changes in past medical history or medical health since last seen in clinic.      Past Medical History  She has no past medical history on file.    Surgical History  She has no past surgical history on file.     Social History  She reports that she has been smoking cigarettes. She started smoking about 25 years ago. She has a 12.6 pack-year smoking history. She has never used smokeless tobacco. She reports current alcohol use of about 20.0 standard drinks of alcohol per week. She reports that she does not use drugs.    Family History  Family History   Problem Relation Name Age of Onset    Alcohol abuse Mother      Alcohol abuse Maternal Grandmother          Allergies  Duloxetine, Gabapentin, Miconazole, Tramadol, Sulfamethoxazole-trimethoprim, and Tioconazole    Review of Symptoms:   Constitutional: Negative for chills, diaphoresis or fever  HENT: Negative for neck swelling  Eyes:.  Negative for eye pain  Respiratory:.  Negative for cough, shortness of breath or wheezing    Cardiovascular:.  Negative for chest pain or palpitations  Gastrointestinal:.  Negative for abdominal pain, nausea and vomiting  Genitourinary:.  Negative for urgency  Musculoskeletal:  Positive for back pain. Positive for joint pain. Denies falls within the past 3 months.  Skin: Negative for wounds or itching   Neurological: Negative for dizziness, seizures, loss of consciousness and weakness  Endo/Heme/Allergies: Does not bruise/bleed easily  Psychiatric/Behavioral: Negative for depression. The patient does not appear anxious.      Pre-sedation Evaluation  ASA class 2  Mallampati score 2     PHYSICAL EXAM  Vitals signs reviewed  Constitutional:       General: Not in acute distress     Appearance: Normal appearance. Not ill-appearing.  HENT:     Head: Normocephalic and atraumatic  Eyes:     Conjunctiva/sclera:  Conjunctivae normal  Cardiovascular:     Rate and Rhythm: Normal rate and regular rhythm  Pulmonary:     Effort: No respiratory distress  Abdominal:     Palpations: Abdomen is soft  Musculoskeletal: VARMA  Skin:     General: Skin is warm and dry  Neurological:     General: No focal deficit present  Psychiatric:         Mood and Affect: Mood normal         Behavior: Behavior normal     Last Recorded Vitals  There were no vitals taken for this visit.    Relevant Results  Current Outpatient Medications   Medication Instructions    buPROPion XL (WELLBUTRIN XL) 150 mg, Daily RT    DULoxetine (Cymbalta) 30 mg DR capsule Takes  2 capsules daily    famotidine (PEPCID) 20 mg, Daily PRN    metFORMIN (GLUCOPHAGE) 1,000 mg, oral, Daily RT    metoprolol succinate XL (TOPROL-XL) 25 mg, Daily    multivitamin capsule 1 capsule, Daily         MR lumbar spine wo IV contrast 01/22/2025    Narrative  Interpreted By:  Suhail Carlos,  STUDY:  MR LUMBAR SPINE WO IV CONTRAST;  1/22/2025 6:44 pm    INDICATION:  Signs/Symptoms:pain. ,M25.551 Pain in right hip    COMPARISON:  None.    ACCESSION NUMBER(S):  [id number]    ORDERING CLINICIAN:  DANIELA ALVARADO    TECHNIQUE:  The lumbar spine was studied in the sagital, axial and coronal planes  utiliing T1 and T2 weighted images.    FINDINGS:  The marrow signal and vertebral body height are normal. The conus and  sacrum are normal. Images at each interspace reveal the following:  T12/L1  There is normal alignment and vertebral body height. The disc space  is normal. There is no evidence of canal or foraminal narrowing.  There is no evidence of bulging or herniated disc. L1/L2  There is normal alignment and vertebral body height. The disc space  is normal. There is no evidence of canal or foraminal narrowing.  There is no evidence of bulging or herniated disc. L2/L3  There is normal alignment and vertebral body height. The disc space  is normal. There is no evidence of canal or foraminal  narrowing.  There is no evidence of bulging or herniated disc. L3/L4  There is normal alignment and vertebral body height. The disc space  is normal. There is no evidence of canal or foraminal narrowing.  There is no evidence of bulging or herniated disc. L4/L5  There is normal alignment and vertebral body height. The disc space  is normal. There is no evidence of canal or foraminal narrowing.  There is no evidence of bulging or herniated disc. L5/S1  Previous laminectomy and discectomy with pedicle screw and plate  fixation as well as interbody fusion. Alignment is normal. No  evidence of pseudoarthrosis. No residual canal stenosis. There is a 5  mm x 15 mm soft tissue structure in the lateral recess on the right  consistent with granulation tissue. Residual or recurrent disc not  excluded. Secondary narrowing of the right lateral recess and neural  foramen.    Impression  * Postoperative changes as described  *There is a 5 mm x 15 mm soft tissue structure in the lateral recess  at L5/S1 on the right that can not be further characterized and is  consistent with residual or recurrent disc herniation, granulation  tissue or seroma.    MACRO:  none    Signed by: Suhail Carlos 1/23/2025 8:52 AM  Dictation workstation:   [id number]     Nerve Block  Table formatting from the original result was not included.  Procedure  Nerve Block    Indication  Pain in right hip    Medications  lidocaine PF (Xylocaine) 20 mg/mL (2 %) injection 1 mL   lidocaine PF (Xylocaine) 5 mg/mL (0.5 %) injection 3 mL   midazolam (Versed) injection 2 mg   iohexol (OMNIPaque) 240 mg iodine/mL solution 1 mL   (Totals for administrations occurring from 0816 to 0833 on 01/27/25)     Preprocedure  A history and physical has been performed, and patient medication   allergies have been reviewed. The patient's tolerance of previous   anesthesia has been reviewed. The risks and benefits of the procedure and   the sedation options and risks were  discussed with the patient. All   questions were answered and informed consent obtained.    Details of the Procedure  Preoperative diagnosis/postoperative diagnosis: Right sided hip pain  Operation/procedure: Right sided femoral/obturator sensory nerve block   under fluoroscopic and ultrasound guidance  Surgeon: Joe  Assistant: Fellow, Bo  Anesthesia: Local, IV sedation 2 mg midazolam  Complications: Apparently none    Clinical note: David Street is a 39-year-old female with a history of   right sided hip pain and prior hip surgeries who presents today for a   diagnostic femoral/obturator sensory nerve block.    The patient also has degenerative changes in the spine and a history of   prior spine surgery with right sided granulation tissue at the L5-S1   level.  We discussed MRI results and discussed that we may consider a   nerve block to this area in the future.  It is important to focus on the   pain after this initial diagnostic block for the hip right after the   procedure, we reviewed to really focus in on the next 1 to 2 hours.    Operation/procedure: The patient was met in the preoperative holding area   and after risks, benefits, and alternatives reviewed with the patient,   informed written consent was obtained.  Patient was brought back to   procedure room and she was able to get in the supine position on the   fluoroscopy table.  The area over the right anterior and lateral hip was   exposed, prepped, and draped in the usual sterile fashion.  Chlorhexidine   utilized.  Thereafter using a combination of ultrasound and fluoroscopic   guidance Chiba needles were inserted in the skin and advanced to the   expected locations of the femoral and obturator sensory branches.    Positioning was confirmed in the final position with x-ray and contrast.    Throughout the entire procedure there was intermittent fluoroscopy used   and anytime the needles were advanced ultrasound guidance was used to   guide the  needles and avoid the neurovascular bundle in the groin/femoral   area, which was done successfully.  In the final position 0.5% of 2%   lidocaine  was injected through each needle tip.  Needles removed,   bandages applied, tolerated the procedure well with no immediate   complications.    Procedure Provider  Adrianna Diaz MD    Procedure Location  [facility]  [address] [facility] [address] [phone]    Referring Provider  Adrianna Diaz MD  [id number]  Department Of Anesthesiology And [place]operative [place] [address]       No diagnosis found.     ASSESSMENT/PLAN  David Street is a 39 y.o. female here for bilateral  L5-S1 transforaminal ANGELI under fluoroscopy    Patient denies any recent antibiotic use or infections, denies any blood thinner use, and denies contrast or local anesthetic allergies     Risks, benefits, alternatives discussed. All questions answered to the best of my ability. Patient agrees to proceed.      Our plan is as follows:  - Proceed with aforementioned procedure          Jim Soriano DO   Pain fellow

## 2025-02-21 ENCOUNTER — HOSPITAL ENCOUNTER (OUTPATIENT)
Facility: HOSPITAL | Age: 40
Discharge: HOME | End: 2025-02-21
Payer: COMMERCIAL

## 2025-02-21 VITALS
HEART RATE: 79 BPM | OXYGEN SATURATION: 96 % | TEMPERATURE: 97.7 F | RESPIRATION RATE: 18 BRPM | SYSTOLIC BLOOD PRESSURE: 114 MMHG | DIASTOLIC BLOOD PRESSURE: 85 MMHG

## 2025-02-21 DIAGNOSIS — M54.16 LUMBAR RADICULOPATHY: ICD-10-CM

## 2025-02-21 PROCEDURE — 64483 NJX AA&/STRD TFRM EPI L/S 1: CPT | Mod: 50 | Performed by: ANESTHESIOLOGY

## 2025-02-21 PROCEDURE — 2550000001 HC RX 255 CONTRASTS: Performed by: ANESTHESIOLOGY

## 2025-02-21 PROCEDURE — 64483 NJX AA&/STRD TFRM EPI L/S 1: CPT | Performed by: ANESTHESIOLOGY

## 2025-02-21 PROCEDURE — 2500000004 HC RX 250 GENERAL PHARMACY W/ HCPCS (ALT 636 FOR OP/ED): Performed by: ANESTHESIOLOGY

## 2025-02-21 RX ORDER — LIDOCAINE HYDROCHLORIDE 5 MG/ML
INJECTION, SOLUTION INFILTRATION; INTRAVENOUS
Status: COMPLETED | OUTPATIENT
Start: 2025-02-21 | End: 2025-02-21

## 2025-02-21 RX ORDER — MIDAZOLAM HYDROCHLORIDE 1 MG/ML
INJECTION, SOLUTION INTRAMUSCULAR; INTRAVENOUS
Status: COMPLETED | OUTPATIENT
Start: 2025-02-21 | End: 2025-02-21

## 2025-02-21 RX ORDER — DEXAMETHASONE SODIUM PHOSPHATE 10 MG/ML
INJECTION INTRAMUSCULAR; INTRAVENOUS
Status: COMPLETED | OUTPATIENT
Start: 2025-02-21 | End: 2025-02-21

## 2025-02-21 RX ADMIN — DEXAMETHASONE SODIUM PHOSPHATE 10 MG: 10 INJECTION, SOLUTION INTRAMUSCULAR; INTRAVENOUS at 13:35

## 2025-02-21 RX ADMIN — IOHEXOL 2 ML: 240 INJECTION, SOLUTION INTRATHECAL; INTRAVASCULAR; INTRAVENOUS; ORAL at 13:34

## 2025-02-21 RX ADMIN — LIDOCAINE HYDROCHLORIDE 10 ML: 5 INJECTION, SOLUTION INFILTRATION at 13:34

## 2025-02-21 RX ADMIN — MIDAZOLAM 4 MG: 1 INJECTION INTRAMUSCULAR; INTRAVENOUS at 13:28

## 2025-02-21 ASSESSMENT — PAIN SCALES - GENERAL
PAINLEVEL_OUTOF10: 6
PAINLEVEL_OUTOF10: 3
PAINLEVEL_OUTOF10: 6
PAINLEVEL_OUTOF10: 3
PAINLEVEL_OUTOF10: 6

## 2025-02-21 ASSESSMENT — PAIN - FUNCTIONAL ASSESSMENT
PAIN_FUNCTIONAL_ASSESSMENT: 0-10
PAIN_FUNCTIONAL_ASSESSMENT: 0-10
PAIN_FUNCTIONAL_ASSESSMENT: WONG-BAKER FACES
PAIN_FUNCTIONAL_ASSESSMENT: WONG-BAKER FACES
PAIN_FUNCTIONAL_ASSESSMENT: 0-10

## 2025-02-21 NOTE — DISCHARGE INSTRUCTIONS
DISCHARGE INSTRUCTIONS FOR INJECTIONS     You underwent right-sided lumbar transforaminal epidural steroid injection today    After most injections, it is recommended that you relax and limit your activity for the remainder of the day unless you have been told otherwise by your pain physician.  You should not drive a car, operate machinery, or make important legal decisions unless otherwise directed by your pain physician.  You may resume your normal activity, including exercise, tomorrow.      Keep a written pain diary of how much pain relief you experienced following the injection procedure and the length of time of pain relief you experienced pain relief. Following diagnostic injections like medial branch nerve blocks, sacroiliac joint blocks, stellate ganglion injections and other blocks, it is very important you record the specific amount of pain relief you experienced immediately after the injectionand how long it lasted. Your doctor will ask you for this information at your follow up visit.     For all injections, please keep the injection site dry and inspect the site for a couple of days. You may remove the Band-Aid the day of the injection at any time.     Some discomfort, bruising or slight swelling may occur at the injection site. This is not abnormal if it occurs.  If needed you may:    -Take over the counter medication such as Tylenol or Motrin.   -Apply an ice pack for 30 minutes, 2 to 3 times a day for the first 24 hours.     You may shower today; no soaking baths, hot tubs, whirlpools or swimming pools for two days.      If you are given steroids in your injection, it may take 3-5 days for the steroid medication to take effect. You may notice a worsening of your symptoms for 1-2 days after the injection. This is not abnormal.  You may use acetaminophen, ibuprofen, or prescription medication that your doctor may have prescribed for you if you need to do so.     A few common side effects of steroids  include facial flushing, sweating, restlessness, irritability,difficulty sleeping, increase in blood sugar, and increased blood pressure. If you have diabetes, please monitor your blood sugar at least once a day for at least 5 days. If you have poorly controlled high blood pressure, monitoryour blood pressure for at least 2 days and contact your primary care physician if these numbers are unusually high for you.      If you take aspirin or non-steroidal anti-inflammatory drugs (examples are Motrin, Advil, ibuprofen, Naprosyn, Voltaren, Relafen, etc.) you may restart these this evening, but stop taking it 3 days before your next appointment, unless instructed otherwiseby your physician.      You do not need to discontinue non-aspirin-containing pain medications prior to an injection (examples: Celebrex, tramadol, hydrocodone and acetaminophen).      If you take a blood thinning medication (Coumadin, Lovenox, Fragmin,Ticlid, Plavix, Pradaxa, etc.), please discuss this with your primary care physician/cardiologist and your pain physician. These medications MUST be discontinued before you can have an injection safely, without the risk of uncontrolled bleeding. If these medications are not discontinued for an appropriate period of time, you will not be able to receivean injection. Please adhere to instructions given to you about when to restart your blood thinning medication. If you have any questions please reach out to our team.    If you are taking Coumadin, please have your INR checked the morning of your procedure and bring the result to your appointment unless otherwise instructed. If your INR is over 1.2, your injection may need to be rescheduled to avoid uncontrolled bleeding from the needle placement.     Call [facility]  and ask for Pain Management at [phone] between 8am-4pm Monday - Friday if you are experiencing the following:    If you received an epidural or spinal injection:    -Headache that doesnot  go away with medicine, is worse when sitting or standing up, and is greatly relieved upon lying down.   -Severe pain worse than or different than your baseline pain.   -Chills or fever (101º F or greater).   -Drainage or signs of infection at the injection site     Go directly to the Emergency Department if you are experiencing the following and received an epidural or spinal injection:   -Abrupt weakness or progressive weakness in your legs that starts after you leave the clinic.   -Abrupt severe or worsening numbness in your legs.   -Inability to urinate after the injection or loss of bowel or bladder control without the urge to defecate or urinate.     If you have a clinical question that cannot wait until your next appointment, please call [phone] between 8am-4pm Monday - Friday or send a [facility] message. We do our best to return all non-emergency messages within 24 hours, Monday - Friday. A nurse or physician will return your message. You may also try calling Dr. Joe Beck's nurse ([phone]) and they will do their best to answer your question(s).    If you need to cancel an appointment, please call the scheduling staff at [phone] during normal business hours or leave a message at least 24 hours in advance.     If you are going to be sedated for your next procedure, you MUST have responsible adult who can legally drive accompany you home. You cannot eat or drink for at least eight hours prior to the planned procedure if you are going to receive sedation. You may take your non-blood thinning medications with a small sip of water.

## 2025-03-06 ENCOUNTER — TELEPHONE (OUTPATIENT)
Dept: ORTHOPEDIC SURGERY | Facility: HOSPITAL | Age: 40
End: 2025-03-06
Payer: COMMERCIAL

## 2025-03-06 NOTE — TELEPHONE ENCOUNTER
Patient called stating her pain is worsening daily. She went to pain management and received epidurals with no relief. She is requesting new imaging to assess for any worsening condition. Advised her to schedule an appointment with Dr. Dewey, but she stated his first availability is 4/11 and does not want to wait. Informed her that a message will be sent to Dr. Dewey regarding the imaging, but the decision is ultimately up to him.

## 2025-04-11 ENCOUNTER — APPOINTMENT (OUTPATIENT)
Dept: ORTHOPEDIC SURGERY | Facility: HOSPITAL | Age: 40
End: 2025-04-11
Payer: COMMERCIAL

## 2025-04-11 DIAGNOSIS — M25.551 PAIN IN RIGHT HIP: Primary | ICD-10-CM

## 2025-04-16 ENCOUNTER — HOSPITAL ENCOUNTER (OUTPATIENT)
Dept: RADIOLOGY | Facility: CLINIC | Age: 40
Discharge: HOME | End: 2025-04-16
Payer: COMMERCIAL

## 2025-04-16 ENCOUNTER — OFFICE VISIT (OUTPATIENT)
Dept: ORTHOPEDIC SURGERY | Facility: CLINIC | Age: 40
End: 2025-04-16
Payer: COMMERCIAL

## 2025-04-16 DIAGNOSIS — M25.551 PAIN IN RIGHT HIP: ICD-10-CM

## 2025-04-16 DIAGNOSIS — M25.852 FEMOROACETABULAR IMPINGEMENT OF BOTH HIPS: Primary | ICD-10-CM

## 2025-04-16 DIAGNOSIS — M25.851 FEMOROACETABULAR IMPINGEMENT OF BOTH HIPS: Primary | ICD-10-CM

## 2025-04-16 PROCEDURE — 73502 X-RAY EXAM HIP UNI 2-3 VIEWS: CPT | Mod: RIGHT SIDE | Performed by: RADIOLOGY

## 2025-04-16 PROCEDURE — 99214 OFFICE O/P EST MOD 30 MIN: CPT | Performed by: ORTHOPAEDIC SURGERY

## 2025-04-16 PROCEDURE — 73502 X-RAY EXAM HIP UNI 2-3 VIEWS: CPT | Mod: RT

## 2025-04-16 RX ORDER — LORAZEPAM 1 MG/1
TABLET ORAL
COMMUNITY
Start: 2024-09-09

## 2025-04-16 RX ORDER — PROGESTERONE 100 MG/1
100 CAPSULE ORAL
COMMUNITY
Start: 2025-04-10

## 2025-04-16 RX ORDER — ESTRADIOL 0.03 MG/D
1 FILM, EXTENDED RELEASE TRANSDERMAL 2 TIMES WEEKLY
COMMUNITY
Start: 2025-04-10 | End: 2025-10-07

## 2025-04-16 RX ORDER — LISDEXAMFETAMINE DIMESYLATE 20 MG/1
20 CAPSULE ORAL EVERY MORNING
COMMUNITY
Start: 2025-03-19

## 2025-04-16 RX ORDER — METHYLPREDNISOLONE 4 MG/1
TABLET ORAL
COMMUNITY
Start: 2025-04-15

## 2025-04-16 RX ORDER — VILAZODONE HYDROCHLORIDE 10 MG/1
1 TABLET ORAL
COMMUNITY
Start: 2025-01-28

## 2025-04-16 NOTE — PROGRESS NOTES
39-year-old female following up again regarding her bilateral hips right greater than left.  She is been seeing pain management and had an MRI of her lumbar spine and is following up with her spine surgeon in [facility].  She is having some right sided radiculopathy symptoms that have been progressive but also she has had progressive change in her bilateral hips that has worsened mostly with groin pain.      The patient does not endorse fevers and chills. The patient does not endorse any change in her vision or hearing. They do not endorse chest pain, shortness of breath. The patient does not endorse any abdominal discomfort. They do not endorse any skin irritation or lesions. They do not endorse any new numbness and tingling or as otherwise stated in the history of present illness.    She is in no acute distress, alert and oriented x 3.    Mood and affect are appropriate.    Respirations are unlabored.    Distal limb is pink and well perfused.    Bilateral lower extremity evaluation demonstrates a markedly globally inflamed hip.  She is painful with gentle passive flexion as well as internal/external rotation.  Has a positive anterior impingement test.  Sensation is intact to light touch in the tibial, sural, saphenous, superficial peroneal, and deep peroneal nerve distributions. Foot is warm and well-perfused.    X-rays reviewed that demonstrate previous bilateral periacetabular osteotomies.  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  She also is a previous L5-S1 fusion.    39-year-old female with a complex pain history.  She is still following up with pain management at  and may also seek a second opinion at [facility].  I think it is worthwhile to revisit advanced imaging of her bilateral hips to see if there is any role at all for arthroscopy or to evaluate her labrum's.  She is not ready for a hip replacement and looking for possible other options to delay this even if they are not  permanent.  I will see her back in about 3 to 4 weeks after her imaging has been done to review options.    Natural History reviewed. All questions answered. The patient was in agreement with the plan.      **This note was created using voice recognition software and was not corrected for typographical or grammatical errors.**

## 2025-05-05 ENCOUNTER — APPOINTMENT (OUTPATIENT)
Dept: RADIOLOGY | Facility: HOSPITAL | Age: 40
End: 2025-05-05
Payer: COMMERCIAL

## 2025-05-05 ENCOUNTER — HOSPITAL ENCOUNTER (INPATIENT)
Facility: HOSPITAL | Age: 40
LOS: 2 days | Discharge: LEFT BEFORE DISCHARGE | End: 2025-05-07
Attending: INTERNAL MEDICINE | Admitting: INTERNAL MEDICINE
Payer: COMMERCIAL

## 2025-05-05 ENCOUNTER — APPOINTMENT (OUTPATIENT)
Dept: CARDIOLOGY | Facility: HOSPITAL | Age: 40
End: 2025-05-05
Payer: COMMERCIAL

## 2025-05-05 DIAGNOSIS — R56.9 ALCOHOL WITHDRAWAL SEIZURE WITH COMPLICATION (MULTI): Primary | ICD-10-CM

## 2025-05-05 DIAGNOSIS — R11.2 NAUSEA AND VOMITING, UNSPECIFIED VOMITING TYPE: ICD-10-CM

## 2025-05-05 DIAGNOSIS — F10.939 ALCOHOL WITHDRAWAL SEIZURE WITH COMPLICATION (MULTI): Primary | ICD-10-CM

## 2025-05-05 LAB
ALBUMIN SERPL BCP-MCNC: 5.6 G/DL (ref 3.4–5)
ALP SERPL-CCNC: 52 U/L (ref 33–110)
ALT SERPL W P-5'-P-CCNC: 56 U/L (ref 7–45)
ANION GAP SERPL CALC-SCNC: 26 MMOL/L (ref 10–20)
APAP SERPL-MCNC: <10 UG/ML (ref ?–30)
AST SERPL W P-5'-P-CCNC: 62 U/L (ref 9–39)
BASOPHILS # BLD AUTO: 0.05 X10*3/UL (ref 0–0.1)
BASOPHILS NFR BLD AUTO: 0.5 %
BILIRUB SERPL-MCNC: 1.4 MG/DL (ref 0–1.2)
BUN SERPL-MCNC: 17 MG/DL (ref 6–23)
CALCIUM SERPL-MCNC: 10.5 MG/DL (ref 8.6–10.3)
CHLORIDE SERPL-SCNC: 94 MMOL/L (ref 98–107)
CO2 SERPL-SCNC: 22 MMOL/L (ref 21–32)
CREAT SERPL-MCNC: 0.56 MG/DL (ref 0.5–1.05)
EGFRCR SERPLBLD CKD-EPI 2021: >90 ML/MIN/1.73M*2
EOSINOPHIL # BLD AUTO: 0 X10*3/UL (ref 0–0.7)
EOSINOPHIL NFR BLD AUTO: 0 %
ERYTHROCYTE [DISTWIDTH] IN BLOOD BY AUTOMATED COUNT: 12.6 % (ref 11.5–14.5)
GLUCOSE SERPL-MCNC: 138 MG/DL (ref 74–99)
HCG UR QL IA.RAPID: NEGATIVE
HCT VFR BLD AUTO: 39.1 % (ref 36–46)
HGB BLD-MCNC: 13.2 G/DL (ref 12–16)
IMM GRANULOCYTES # BLD AUTO: 0.03 X10*3/UL (ref 0–0.7)
IMM GRANULOCYTES NFR BLD AUTO: 0.3 % (ref 0–0.9)
LACTATE SERPL-SCNC: 1.1 MMOL/L (ref 0.4–2)
LACTATE SERPL-SCNC: 17.4 MMOL/L (ref 0.4–2)
LACTATE SERPL-SCNC: 4.3 MMOL/L (ref 0.4–2)
LIPASE SERPL-CCNC: 45 U/L (ref 9–82)
LYMPHOCYTES # BLD AUTO: 0.86 X10*3/UL (ref 1.2–4.8)
LYMPHOCYTES NFR BLD AUTO: 8.3 %
MCH RBC QN AUTO: 32.5 PG (ref 26–34)
MCHC RBC AUTO-ENTMCNC: 33.8 G/DL (ref 32–36)
MCV RBC AUTO: 96 FL (ref 80–100)
MONOCYTES # BLD AUTO: 0.66 X10*3/UL (ref 0.1–1)
MONOCYTES NFR BLD AUTO: 6.3 %
NEUTROPHILS # BLD AUTO: 8.8 X10*3/UL (ref 1.2–7.7)
NEUTROPHILS NFR BLD AUTO: 84.6 %
NRBC BLD-RTO: 0 /100 WBCS (ref 0–0)
PLATELET # BLD AUTO: 229 X10*3/UL (ref 150–450)
POTASSIUM SERPL-SCNC: 3.7 MMOL/L (ref 3.5–5.3)
PROT SERPL-MCNC: 8.4 G/DL (ref 6.4–8.2)
RBC # BLD AUTO: 4.06 X10*6/UL (ref 4–5.2)
SODIUM SERPL-SCNC: 138 MMOL/L (ref 136–145)
WBC # BLD AUTO: 10.4 X10*3/UL (ref 4.4–11.3)

## 2025-05-05 PROCEDURE — 1210000001 HC SEMI-PRIVATE ROOM DAILY

## 2025-05-05 PROCEDURE — 96361 HYDRATE IV INFUSION ADD-ON: CPT

## 2025-05-05 PROCEDURE — 96375 TX/PRO/DX INJ NEW DRUG ADDON: CPT

## 2025-05-05 PROCEDURE — 36415 COLL VENOUS BLD VENIPUNCTURE: CPT | Performed by: NURSE PRACTITIONER

## 2025-05-05 PROCEDURE — 93005 ELECTROCARDIOGRAM TRACING: CPT

## 2025-05-05 PROCEDURE — 70450 CT HEAD/BRAIN W/O DYE: CPT

## 2025-05-05 PROCEDURE — 74177 CT ABD & PELVIS W/CONTRAST: CPT

## 2025-05-05 PROCEDURE — 83605 ASSAY OF LACTIC ACID: CPT | Performed by: NURSE PRACTITIONER

## 2025-05-05 PROCEDURE — 99285 EMERGENCY DEPT VISIT HI MDM: CPT | Mod: 25 | Performed by: INTERNAL MEDICINE

## 2025-05-05 PROCEDURE — 96374 THER/PROPH/DIAG INJ IV PUSH: CPT

## 2025-05-05 PROCEDURE — 83690 ASSAY OF LIPASE: CPT | Performed by: NURSE PRACTITIONER

## 2025-05-05 PROCEDURE — 80053 COMPREHEN METABOLIC PANEL: CPT | Performed by: NURSE PRACTITIONER

## 2025-05-05 PROCEDURE — 80143 DRUG ASSAY ACETAMINOPHEN: CPT | Performed by: INTERNAL MEDICINE

## 2025-05-05 PROCEDURE — 2500000004 HC RX 250 GENERAL PHARMACY W/ HCPCS (ALT 636 FOR OP/ED): Mod: JZ | Performed by: NURSE PRACTITIONER

## 2025-05-05 PROCEDURE — 74177 CT ABD & PELVIS W/CONTRAST: CPT | Performed by: STUDENT IN AN ORGANIZED HEALTH CARE EDUCATION/TRAINING PROGRAM

## 2025-05-05 PROCEDURE — 70450 CT HEAD/BRAIN W/O DYE: CPT | Performed by: STUDENT IN AN ORGANIZED HEALTH CARE EDUCATION/TRAINING PROGRAM

## 2025-05-05 PROCEDURE — 87086 URINE CULTURE/COLONY COUNT: CPT | Mod: AHULAB | Performed by: NURSE PRACTITIONER

## 2025-05-05 PROCEDURE — 81001 URINALYSIS AUTO W/SCOPE: CPT | Performed by: NURSE PRACTITIONER

## 2025-05-05 PROCEDURE — 85025 COMPLETE CBC W/AUTO DIFF WBC: CPT | Performed by: NURSE PRACTITIONER

## 2025-05-05 PROCEDURE — 83605 ASSAY OF LACTIC ACID: CPT | Performed by: INTERNAL MEDICINE

## 2025-05-05 PROCEDURE — 81025 URINE PREGNANCY TEST: CPT | Performed by: NURSE PRACTITIONER

## 2025-05-05 PROCEDURE — 2550000001 HC RX 255 CONTRASTS: Performed by: INTERNAL MEDICINE

## 2025-05-05 PROCEDURE — 2500000004 HC RX 250 GENERAL PHARMACY W/ HCPCS (ALT 636 FOR OP/ED): Performed by: INTERNAL MEDICINE

## 2025-05-05 RX ORDER — LORAZEPAM 2 MG/ML
0.5 INJECTION INTRAMUSCULAR EVERY 2 HOUR PRN
Status: DISCONTINUED | OUTPATIENT
Start: 2025-05-05 | End: 2025-05-06

## 2025-05-05 RX ORDER — LANOLIN ALCOHOL/MO/W.PET/CERES
100 CREAM (GRAM) TOPICAL DAILY
Status: DISCONTINUED | OUTPATIENT
Start: 2025-05-06 | End: 2025-05-06

## 2025-05-05 RX ORDER — LORAZEPAM 2 MG/ML
1 INJECTION INTRAMUSCULAR EVERY 2 HOUR PRN
Status: DISCONTINUED | OUTPATIENT
Start: 2025-05-05 | End: 2025-05-06

## 2025-05-05 RX ORDER — ONDANSETRON HYDROCHLORIDE 2 MG/ML
4 INJECTION, SOLUTION INTRAVENOUS ONCE
Status: COMPLETED | OUTPATIENT
Start: 2025-05-05 | End: 2025-05-05

## 2025-05-05 RX ORDER — FOLIC ACID 1 MG/1
1 TABLET ORAL DAILY
Status: DISCONTINUED | OUTPATIENT
Start: 2025-05-06 | End: 2025-05-07 | Stop reason: HOSPADM

## 2025-05-05 RX ORDER — LORAZEPAM 2 MG/ML
INJECTION INTRAMUSCULAR
Status: DISPENSED
Start: 2025-05-05 | End: 2025-05-06

## 2025-05-05 RX ORDER — PROCHLORPERAZINE EDISYLATE 5 MG/ML
10 INJECTION INTRAMUSCULAR; INTRAVENOUS ONCE
Status: COMPLETED | OUTPATIENT
Start: 2025-05-05 | End: 2025-05-05

## 2025-05-05 RX ORDER — SODIUM CHLORIDE, SODIUM LACTATE, POTASSIUM CHLORIDE, CALCIUM CHLORIDE 600; 310; 30; 20 MG/100ML; MG/100ML; MG/100ML; MG/100ML
1000 INJECTION, SOLUTION INTRAVENOUS ONCE
Status: COMPLETED | OUTPATIENT
Start: 2025-05-05 | End: 2025-05-05

## 2025-05-05 RX ORDER — LORAZEPAM 2 MG/ML
2 INJECTION INTRAMUSCULAR EVERY 2 HOUR PRN
Status: DISCONTINUED | OUTPATIENT
Start: 2025-05-05 | End: 2025-05-06

## 2025-05-05 RX ORDER — MULTIVIT-MIN/IRON FUM/FOLIC AC 7.5 MG-4
1 TABLET ORAL DAILY
Status: DISCONTINUED | OUTPATIENT
Start: 2025-05-06 | End: 2025-05-07 | Stop reason: HOSPADM

## 2025-05-05 RX ORDER — DIPHENHYDRAMINE HYDROCHLORIDE 50 MG/ML
25 INJECTION, SOLUTION INTRAMUSCULAR; INTRAVENOUS ONCE
Status: COMPLETED | OUTPATIENT
Start: 2025-05-05 | End: 2025-05-05

## 2025-05-05 RX ADMIN — ONDANSETRON 4 MG: 2 INJECTION, SOLUTION INTRAMUSCULAR; INTRAVENOUS at 19:05

## 2025-05-05 RX ADMIN — PROCHLORPERAZINE EDISYLATE 10 MG: 5 INJECTION INTRAMUSCULAR; INTRAVENOUS at 20:36

## 2025-05-05 RX ADMIN — DIPHENHYDRAMINE HYDROCHLORIDE 25 MG: 50 INJECTION, SOLUTION INTRAMUSCULAR; INTRAVENOUS at 20:36

## 2025-05-05 RX ADMIN — LORAZEPAM 2 MG: 2 INJECTION INTRAMUSCULAR; INTRAVENOUS at 22:21

## 2025-05-05 RX ADMIN — SODIUM CHLORIDE, SODIUM LACTATE, POTASSIUM CHLORIDE, AND CALCIUM CHLORIDE 1000 ML: .6; .31; .03; .02 INJECTION, SOLUTION INTRAVENOUS at 20:38

## 2025-05-05 RX ADMIN — SODIUM CHLORIDE, SODIUM LACTATE, POTASSIUM CHLORIDE, AND CALCIUM CHLORIDE 1000 ML: .6; .31; .03; .02 INJECTION, SOLUTION INTRAVENOUS at 19:06

## 2025-05-05 RX ADMIN — IOHEXOL 75 ML: 350 INJECTION, SOLUTION INTRAVENOUS at 21:56

## 2025-05-05 ASSESSMENT — COLUMBIA-SUICIDE SEVERITY RATING SCALE - C-SSRS
6. HAVE YOU EVER DONE ANYTHING, STARTED TO DO ANYTHING, OR PREPARED TO DO ANYTHING TO END YOUR LIFE?: NO
1. IN THE PAST MONTH, HAVE YOU WISHED YOU WERE DEAD OR WISHED YOU COULD GO TO SLEEP AND NOT WAKE UP?: NO
2. HAVE YOU ACTUALLY HAD ANY THOUGHTS OF KILLING YOURSELF?: NO

## 2025-05-05 ASSESSMENT — PAIN SCALES - GENERAL
PAINLEVEL_OUTOF10: 0 - NO PAIN
PAINLEVEL_OUTOF10: 0 - NO PAIN
PAINLEVEL_OUTOF10: 5 - MODERATE PAIN

## 2025-05-05 ASSESSMENT — LIFESTYLE VARIABLES
AGITATION: SOMEWHAT MORE THAN NORMAL ACTIVITY
NAUSEA AND VOMITING: 3
PULSE: 95
TREMOR: NOT VISIBLE, BUT CAN BE FELT FINGERTIP TO FINGERTIP
VISUAL DISTURBANCES: NOT PRESENT
BLOOD PRESSURE: 142/95
AUDITORY DISTURBANCES: VERY MILD HARSHNESS OR ABILITY TO FRIGHTEN
TACTILE DISTURBANCES: VERY MILD ITCHING, PINS AND NEEDLES, BURNING OR NUMBNESS
ORIENTATION AND CLOUDING OF SENSORIUM: DISORIENTED FOR DATE BY MORE THAN 2 CALENDAR DAYS
PAROXYSMAL SWEATS: NO SWEAT VISIBLE
TOTAL SCORE: 14
HEADACHE, FULLNESS IN HEAD: NOT PRESENT
ANXIETY: MODERATELY ANXIOUS, OR GUARDED, SO ANXIETY IS INFERRED

## 2025-05-05 ASSESSMENT — PAIN - FUNCTIONAL ASSESSMENT: PAIN_FUNCTIONAL_ASSESSMENT: 0-10

## 2025-05-05 ASSESSMENT — PAIN DESCRIPTION - LOCATION: LOCATION: ABDOMEN

## 2025-05-05 NOTE — ED TRIAGE NOTES
Pt to ED with complaint of vomiting, nausea, weakness, diaphoresis, and dizziness onset of this morning. Arrives in wheelchair a/o x4. Respirations regular and unlabored.

## 2025-05-05 NOTE — ED TRIAGE NOTES
Secondary to patient volumes and overcrowding, I performed a brief medical screening exam of the patient in triage, as the patient awaits space in the main ED.    History of Present Illness:  David Street presents with   Chief Complaint   Patient presents with    Abdominal Pain    Vomiting    Dizziness   According to patient family member she has had 2 falls due to extreme lightheadedness, they believe she is very dehydrated.  She denies any focal numbness or weakness.  She denies any vision changes.    Physical Exam:  General - In no acute distress  Respiratory - Breathing comfortably  Cardiac - Normal S1, S2, no m/g/r  Neurologic - Moving all extremities  Abdomen -bowel sounds present, no CVAT, nontender    Medical Decision Making:  Patient will require further evaluation in the main ED.    Initial diagnostic tests were ordered from triage.      The patient demonstrates understanding that this initial evaluation is a brief medical screening exam and the expectation is that they await for space in the main ED to be further evaluated.  The patient understands that, if they leave prior to further evaluation in the main ED after this initial evaluation in triage, they are doing so under their own accord knowing that their evaluation/work-up is not yet complete. The patient also understands that any preliminary diagnostic results, including abnormalities, may not be shared with them, if they choose to leave prior to further evaluation in the main ED.

## 2025-05-06 ENCOUNTER — APPOINTMENT (OUTPATIENT)
Dept: NEUROLOGY | Facility: HOSPITAL | Age: 40
End: 2025-05-06
Payer: COMMERCIAL

## 2025-05-06 ENCOUNTER — APPOINTMENT (OUTPATIENT)
Dept: RADIOLOGY | Facility: HOSPITAL | Age: 40
End: 2025-05-06
Payer: COMMERCIAL

## 2025-05-06 LAB
ALBUMIN SERPL BCP-MCNC: 4.5 G/DL (ref 3.4–5)
ALP SERPL-CCNC: 40 U/L (ref 33–110)
ALT SERPL W P-5'-P-CCNC: 37 U/L (ref 7–45)
ANION GAP SERPL CALC-SCNC: 17 MMOL/L (ref 10–20)
APPEARANCE UR: ABNORMAL
AST SERPL W P-5'-P-CCNC: 37 U/L (ref 9–39)
BACTERIA #/AREA URNS AUTO: ABNORMAL /HPF
BILIRUB SERPL-MCNC: 1.2 MG/DL (ref 0–1.2)
BILIRUB UR STRIP.AUTO-MCNC: NEGATIVE MG/DL
BUN SERPL-MCNC: 13 MG/DL (ref 6–23)
CALCIUM SERPL-MCNC: 9.6 MG/DL (ref 8.6–10.3)
CHLORIDE SERPL-SCNC: 100 MMOL/L (ref 98–107)
CO2 SERPL-SCNC: 23 MMOL/L (ref 21–32)
COLOR UR: YELLOW
CREAT SERPL-MCNC: 0.48 MG/DL (ref 0.5–1.05)
EGFRCR SERPLBLD CKD-EPI 2021: >90 ML/MIN/1.73M*2
ERYTHROCYTE [DISTWIDTH] IN BLOOD BY AUTOMATED COUNT: 13 % (ref 11.5–14.5)
GLUCOSE SERPL-MCNC: 82 MG/DL (ref 74–99)
GLUCOSE UR STRIP.AUTO-MCNC: NEGATIVE MG/DL
HCT VFR BLD AUTO: 32.5 % (ref 36–46)
HGB BLD-MCNC: 11.2 G/DL (ref 12–16)
KETONES UR STRIP.AUTO-MCNC: ABNORMAL MG/DL
LEUKOCYTE ESTERASE UR QL STRIP.AUTO: NEGATIVE
MCH RBC QN AUTO: 33.2 PG (ref 26–34)
MCHC RBC AUTO-ENTMCNC: 34.5 G/DL (ref 32–36)
MCV RBC AUTO: 96 FL (ref 80–100)
NITRITE UR QL STRIP.AUTO: ABNORMAL
NRBC BLD-RTO: 0 /100 WBCS (ref 0–0)
PH UR STRIP.AUTO: 8.5 [PH]
PLATELET # BLD AUTO: 175 X10*3/UL (ref 150–450)
POTASSIUM SERPL-SCNC: 3.2 MMOL/L (ref 3.5–5.3)
PROT SERPL-MCNC: 6.8 G/DL (ref 6.4–8.2)
PROT UR STRIP.AUTO-MCNC: ABNORMAL MG/DL
RBC # BLD AUTO: 3.37 X10*6/UL (ref 4–5.2)
RBC # UR STRIP.AUTO: NEGATIVE MG/DL
RBC #/AREA URNS AUTO: ABNORMAL /HPF
SODIUM SERPL-SCNC: 137 MMOL/L (ref 136–145)
SP GR UR STRIP.AUTO: 1.01
SQUAMOUS #/AREA URNS AUTO: ABNORMAL /HPF
UROBILINOGEN UR STRIP.AUTO-MCNC: ABNORMAL MG/DL
WBC # BLD AUTO: 5.1 X10*3/UL (ref 4.4–11.3)
WBC #/AREA URNS AUTO: ABNORMAL /HPF

## 2025-05-06 PROCEDURE — 2500000004 HC RX 250 GENERAL PHARMACY W/ HCPCS (ALT 636 FOR OP/ED): Performed by: NURSE PRACTITIONER

## 2025-05-06 PROCEDURE — 2500000001 HC RX 250 WO HCPCS SELF ADMINISTERED DRUGS (ALT 637 FOR MEDICARE OP): Performed by: NURSE PRACTITIONER

## 2025-05-06 PROCEDURE — 2500000002 HC RX 250 W HCPCS SELF ADMINISTERED DRUGS (ALT 637 FOR MEDICARE OP, ALT 636 FOR OP/ED): Performed by: INTERNAL MEDICINE

## 2025-05-06 PROCEDURE — 85027 COMPLETE CBC AUTOMATED: CPT | Performed by: NURSE PRACTITIONER

## 2025-05-06 PROCEDURE — 2550000001 HC RX 255 CONTRASTS: Mod: JZ | Performed by: INTERNAL MEDICINE

## 2025-05-06 PROCEDURE — 70553 MRI BRAIN STEM W/O & W/DYE: CPT | Performed by: RADIOLOGY

## 2025-05-06 PROCEDURE — 2500000001 HC RX 250 WO HCPCS SELF ADMINISTERED DRUGS (ALT 637 FOR MEDICARE OP): Performed by: INTERNAL MEDICINE

## 2025-05-06 PROCEDURE — 36415 COLL VENOUS BLD VENIPUNCTURE: CPT | Performed by: NURSE PRACTITIONER

## 2025-05-06 PROCEDURE — 80053 COMPREHEN METABOLIC PANEL: CPT | Performed by: NURSE PRACTITIONER

## 2025-05-06 PROCEDURE — 1200000002 HC GENERAL ROOM WITH TELEMETRY DAILY

## 2025-05-06 PROCEDURE — 99232 SBSQ HOSP IP/OBS MODERATE 35: CPT | Performed by: STUDENT IN AN ORGANIZED HEALTH CARE EDUCATION/TRAINING PROGRAM

## 2025-05-06 PROCEDURE — A9575 INJ GADOTERATE MEGLUMI 0.1ML: HCPCS | Mod: JZ | Performed by: INTERNAL MEDICINE

## 2025-05-06 PROCEDURE — 95819 EEG AWAKE AND ASLEEP: CPT | Performed by: STUDENT IN AN ORGANIZED HEALTH CARE EDUCATION/TRAINING PROGRAM

## 2025-05-06 PROCEDURE — 99232 SBSQ HOSP IP/OBS MODERATE 35: CPT | Performed by: INTERNAL MEDICINE

## 2025-05-06 PROCEDURE — 2500000004 HC RX 250 GENERAL PHARMACY W/ HCPCS (ALT 636 FOR OP/ED): Performed by: INTERNAL MEDICINE

## 2025-05-06 PROCEDURE — 99223 1ST HOSP IP/OBS HIGH 75: CPT | Performed by: INTERNAL MEDICINE

## 2025-05-06 PROCEDURE — 95819 EEG AWAKE AND ASLEEP: CPT

## 2025-05-06 PROCEDURE — 70553 MRI BRAIN STEM W/O & W/DYE: CPT

## 2025-05-06 RX ORDER — SODIUM CHLORIDE 9 MG/ML
100 INJECTION, SOLUTION INTRAVENOUS CONTINUOUS
Status: DISCONTINUED | OUTPATIENT
Start: 2025-05-06 | End: 2025-05-06

## 2025-05-06 RX ORDER — ACETAMINOPHEN 650 MG/1
650 SUPPOSITORY RECTAL EVERY 4 HOURS PRN
Status: DISCONTINUED | OUTPATIENT
Start: 2025-05-06 | End: 2025-05-07 | Stop reason: HOSPADM

## 2025-05-06 RX ORDER — PHENOBARBITAL 32.4 MG/1
32.4 TABLET ORAL 3 TIMES DAILY
Status: DISCONTINUED | OUTPATIENT
Start: 2025-05-08 | End: 2025-05-07 | Stop reason: HOSPADM

## 2025-05-06 RX ORDER — BUPROPION HYDROCHLORIDE 150 MG/1
150 TABLET ORAL DAILY
Status: DISCONTINUED | OUTPATIENT
Start: 2025-05-06 | End: 2025-05-07 | Stop reason: HOSPADM

## 2025-05-06 RX ORDER — METOPROLOL SUCCINATE 25 MG/1
25 TABLET, EXTENDED RELEASE ORAL DAILY
Status: DISCONTINUED | OUTPATIENT
Start: 2025-05-06 | End: 2025-05-07 | Stop reason: HOSPADM

## 2025-05-06 RX ORDER — PHENOBARBITAL 32.4 MG/1
65 TABLET ORAL EVERY 6 HOURS PRN
Status: DISCONTINUED | OUTPATIENT
Start: 2025-05-06 | End: 2025-05-07 | Stop reason: HOSPADM

## 2025-05-06 RX ORDER — PHENOBARBITAL 32.4 MG/1
1.8 TABLET ORAL
Status: COMPLETED | OUTPATIENT
Start: 2025-05-06 | End: 2025-05-06

## 2025-05-06 RX ORDER — VILAZODONE HYDROCHLORIDE 20 MG/1
10 TABLET ORAL
Status: DISCONTINUED | OUTPATIENT
Start: 2025-05-06 | End: 2025-05-07 | Stop reason: HOSPADM

## 2025-05-06 RX ORDER — GADOTERATE MEGLUMINE 376.9 MG/ML
10 INJECTION INTRAVENOUS
Status: COMPLETED | OUTPATIENT
Start: 2025-05-06 | End: 2025-05-06

## 2025-05-06 RX ORDER — PANTOPRAZOLE SODIUM 40 MG/10ML
40 INJECTION, POWDER, LYOPHILIZED, FOR SOLUTION INTRAVENOUS
Status: DISCONTINUED | OUTPATIENT
Start: 2025-05-06 | End: 2025-05-07 | Stop reason: HOSPADM

## 2025-05-06 RX ORDER — THIAMINE HYDROCHLORIDE 100 MG/ML
100 INJECTION, SOLUTION INTRAMUSCULAR; INTRAVENOUS DAILY
Status: DISCONTINUED | OUTPATIENT
Start: 2025-05-06 | End: 2025-05-07 | Stop reason: HOSPADM

## 2025-05-06 RX ORDER — PHENOBARBITAL 32.4 MG/1
2.4 TABLET ORAL ONCE
Status: COMPLETED | OUTPATIENT
Start: 2025-05-06 | End: 2025-05-06

## 2025-05-06 RX ORDER — PANTOPRAZOLE SODIUM 40 MG/1
40 TABLET, DELAYED RELEASE ORAL
Status: DISCONTINUED | OUTPATIENT
Start: 2025-05-06 | End: 2025-05-07 | Stop reason: HOSPADM

## 2025-05-06 RX ORDER — ACETAMINOPHEN 160 MG/5ML
650 SOLUTION ORAL EVERY 4 HOURS PRN
Status: DISCONTINUED | OUTPATIENT
Start: 2025-05-06 | End: 2025-05-07 | Stop reason: HOSPADM

## 2025-05-06 RX ORDER — ACETAMINOPHEN 325 MG/1
650 TABLET ORAL EVERY 4 HOURS PRN
Status: DISCONTINUED | OUTPATIENT
Start: 2025-05-06 | End: 2025-05-07 | Stop reason: HOSPADM

## 2025-05-06 RX ORDER — ONDANSETRON HYDROCHLORIDE 2 MG/ML
4 INJECTION, SOLUTION INTRAVENOUS EVERY 8 HOURS PRN
Status: DISCONTINUED | OUTPATIENT
Start: 2025-05-06 | End: 2025-05-07 | Stop reason: HOSPADM

## 2025-05-06 RX ORDER — POLYETHYLENE GLYCOL 3350 17 G/17G
17 POWDER, FOR SOLUTION ORAL DAILY PRN
Status: DISCONTINUED | OUTPATIENT
Start: 2025-05-06 | End: 2025-05-07 | Stop reason: HOSPADM

## 2025-05-06 RX ORDER — PHENOBARBITAL 32.4 MG/1
64.8 TABLET ORAL 3 TIMES DAILY
Status: DISCONTINUED | OUTPATIENT
Start: 2025-05-07 | End: 2025-05-07 | Stop reason: HOSPADM

## 2025-05-06 RX ORDER — TRAZODONE HYDROCHLORIDE 50 MG/1
50 TABLET ORAL AS NEEDED
COMMUNITY

## 2025-05-06 RX ORDER — POTASSIUM CHLORIDE 20 MEQ/1
40 TABLET, EXTENDED RELEASE ORAL ONCE
Status: COMPLETED | OUTPATIENT
Start: 2025-05-06 | End: 2025-05-06

## 2025-05-06 RX ORDER — ONDANSETRON 4 MG/1
4 TABLET, FILM COATED ORAL EVERY 8 HOURS PRN
Status: DISCONTINUED | OUTPATIENT
Start: 2025-05-06 | End: 2025-05-07 | Stop reason: HOSPADM

## 2025-05-06 RX ADMIN — LORAZEPAM 1 MG: 2 INJECTION INTRAMUSCULAR; INTRAVENOUS at 11:21

## 2025-05-06 RX ADMIN — THIAMINE HYDROCHLORIDE 100 MG: 100 INJECTION, SOLUTION INTRAMUSCULAR; INTRAVENOUS at 08:49

## 2025-05-06 RX ADMIN — LORAZEPAM 0.5 MG: 2 INJECTION INTRAMUSCULAR; INTRAVENOUS at 02:23

## 2025-05-06 RX ADMIN — LORAZEPAM 2 MG: 2 INJECTION INTRAMUSCULAR; INTRAVENOUS at 09:00

## 2025-05-06 RX ADMIN — SODIUM CHLORIDE 100 ML/HR: 0.9 INJECTION, SOLUTION INTRAVENOUS at 02:30

## 2025-05-06 RX ADMIN — METOPROLOL SUCCINATE 25 MG: 25 TABLET, EXTENDED RELEASE ORAL at 08:49

## 2025-05-06 RX ADMIN — BUPROPION HYDROCHLORIDE 150 MG: 150 TABLET, EXTENDED RELEASE ORAL at 08:49

## 2025-05-06 RX ADMIN — PANTOPRAZOLE SODIUM 40 MG: 40 TABLET, DELAYED RELEASE ORAL at 05:46

## 2025-05-06 RX ADMIN — FOLIC ACID 1 MG: 1 TABLET ORAL at 08:49

## 2025-05-06 RX ADMIN — LORAZEPAM 2 MG: 2 INJECTION INTRAMUSCULAR; INTRAVENOUS at 00:21

## 2025-05-06 RX ADMIN — Medication 1 TABLET: at 08:49

## 2025-05-06 RX ADMIN — VILAZODONE HYDROCHLORIDE 10 MG: 20 TABLET ORAL at 09:54

## 2025-05-06 RX ADMIN — PHENOBARBITAL 129.6 MG: 32.4 TABLET ORAL at 14:35

## 2025-05-06 RX ADMIN — PHENOBARBITAL 97.2 MG: 32.4 TABLET ORAL at 20:44

## 2025-05-06 RX ADMIN — GADOTERATE MEGLUMINE 10 ML: 376.9 INJECTION INTRAVENOUS at 17:50

## 2025-05-06 RX ADMIN — POTASSIUM CHLORIDE 40 MEQ: 1500 TABLET, EXTENDED RELEASE ORAL at 08:49

## 2025-05-06 RX ADMIN — PHENOBARBITAL 97.2 MG: 32.4 TABLET ORAL at 17:02

## 2025-05-06 SDOH — SOCIAL STABILITY: SOCIAL INSECURITY: HAS ANYONE EVER THREATENED TO HURT YOUR FAMILY OR YOUR PETS?: NO

## 2025-05-06 SDOH — SOCIAL STABILITY: SOCIAL INSECURITY: WITHIN THE LAST YEAR, HAVE YOU BEEN HUMILIATED OR EMOTIONALLY ABUSED IN OTHER WAYS BY YOUR PARTNER OR EX-PARTNER?: NO

## 2025-05-06 SDOH — ECONOMIC STABILITY: INCOME INSECURITY: IN THE PAST 12 MONTHS HAS THE ELECTRIC, GAS, OIL, OR WATER COMPANY THREATENED TO SHUT OFF SERVICES IN YOUR HOME?: NO

## 2025-05-06 SDOH — SOCIAL STABILITY: SOCIAL INSECURITY: ABUSE: ADULT

## 2025-05-06 SDOH — SOCIAL STABILITY: SOCIAL INSECURITY: ARE YOU OR HAVE YOU BEEN THREATENED OR ABUSED PHYSICALLY, EMOTIONALLY, OR SEXUALLY BY ANYONE?: NO

## 2025-05-06 SDOH — ECONOMIC STABILITY: FOOD INSECURITY: WITHIN THE PAST 12 MONTHS, THE FOOD YOU BOUGHT JUST DIDN'T LAST AND YOU DIDN'T HAVE MONEY TO GET MORE.: NEVER TRUE

## 2025-05-06 SDOH — SOCIAL STABILITY: SOCIAL INSECURITY
WITHIN THE LAST YEAR, HAVE YOU BEEN RAPED OR FORCED TO HAVE ANY KIND OF SEXUAL ACTIVITY BY YOUR PARTNER OR EX-PARTNER?: NO

## 2025-05-06 SDOH — SOCIAL STABILITY: SOCIAL INSECURITY: WERE YOU ABLE TO COMPLETE ALL THE BEHAVIORAL HEALTH SCREENINGS?: YES

## 2025-05-06 SDOH — SOCIAL STABILITY: SOCIAL INSECURITY: HAVE YOU HAD THOUGHTS OF HARMING ANYONE ELSE?: NO

## 2025-05-06 SDOH — SOCIAL STABILITY: SOCIAL INSECURITY: WITHIN THE LAST YEAR, HAVE YOU BEEN AFRAID OF YOUR PARTNER OR EX-PARTNER?: NO

## 2025-05-06 SDOH — SOCIAL STABILITY: SOCIAL INSECURITY: DO YOU FEEL ANYONE HAS EXPLOITED OR TAKEN ADVANTAGE OF YOU FINANCIALLY OR OF YOUR PERSONAL PROPERTY?: NO

## 2025-05-06 SDOH — ECONOMIC STABILITY: FOOD INSECURITY: WITHIN THE PAST 12 MONTHS, YOU WORRIED THAT YOUR FOOD WOULD RUN OUT BEFORE YOU GOT THE MONEY TO BUY MORE.: NEVER TRUE

## 2025-05-06 SDOH — SOCIAL STABILITY: SOCIAL INSECURITY
WITHIN THE LAST YEAR, HAVE YOU BEEN KICKED, HIT, SLAPPED, OR OTHERWISE PHYSICALLY HURT BY YOUR PARTNER OR EX-PARTNER?: NO

## 2025-05-06 SDOH — SOCIAL STABILITY: SOCIAL INSECURITY: DO YOU FEEL UNSAFE GOING BACK TO THE PLACE WHERE YOU ARE LIVING?: NO

## 2025-05-06 SDOH — SOCIAL STABILITY: SOCIAL INSECURITY: ARE THERE ANY APPARENT SIGNS OF INJURIES/BEHAVIORS THAT COULD BE RELATED TO ABUSE/NEGLECT?: NO

## 2025-05-06 SDOH — SOCIAL STABILITY: SOCIAL INSECURITY: DOES ANYONE TRY TO KEEP YOU FROM HAVING/CONTACTING OTHER FRIENDS OR DOING THINGS OUTSIDE YOUR HOME?: NO

## 2025-05-06 ASSESSMENT — ACTIVITIES OF DAILY LIVING (ADL)
ADEQUATE_TO_COMPLETE_ADL: YES
HEARING - LEFT EAR: FUNCTIONAL
HEARING - RIGHT EAR: FUNCTIONAL
FEEDING YOURSELF: INDEPENDENT
PATIENT'S MEMORY ADEQUATE TO SAFELY COMPLETE DAILY ACTIVITIES?: YES
GROOMING: INDEPENDENT
LACK_OF_TRANSPORTATION: NO
BATHING: INDEPENDENT
LACK_OF_TRANSPORTATION: NO
WALKS IN HOME: INDEPENDENT
TOILETING: INDEPENDENT
JUDGMENT_ADEQUATE_SAFELY_COMPLETE_DAILY_ACTIVITIES: YES
ASSISTIVE_DEVICE: WALKER
DRESSING YOURSELF: INDEPENDENT

## 2025-05-06 ASSESSMENT — LIFESTYLE VARIABLES
HEADACHE, FULLNESS IN HEAD: NOT PRESENT
HEADACHE, FULLNESS IN HEAD: NOT PRESENT
ORIENTATION AND CLOUDING OF SENSORIUM: ORIENTED AND CAN DO SERIAL ADDITIONS
PAROXYSMAL SWEATS: NO SWEAT VISIBLE
PAROXYSMAL SWEATS: NO SWEAT VISIBLE
AGITATION: NORMAL ACTIVITY
TOTAL SCORE: 10
AGITATION: NORMAL ACTIVITY
TREMOR: NOT VISIBLE, BUT CAN BE FELT FINGERTIP TO FINGERTIP
VISUAL DISTURBANCES: NOT PRESENT
HEADACHE, FULLNESS IN HEAD: VERY MILD
BLOOD PRESSURE: 118/90
VISUAL DISTURBANCES: NOT PRESENT
ANXIETY: 3
PAROXYSMAL SWEATS: NO SWEAT VISIBLE
AUDIT-C TOTAL SCORE: 12
TOTAL SCORE: 11
AGITATION: NORMAL ACTIVITY
AGITATION: NORMAL ACTIVITY
PAROXYSMAL SWEATS: NO SWEAT VISIBLE
HEADACHE, FULLNESS IN HEAD: MILD
HOW OFTEN DO YOU HAVE A DRINK CONTAINING ALCOHOL: 4 OR MORE TIMES A WEEK
TREMOR: NO TREMOR
PAROXYSMAL SWEATS: NO SWEAT VISIBLE
ORIENTATION AND CLOUDING OF SENSORIUM: ORIENTED AND CAN DO SERIAL ADDITIONS
VISUAL DISTURBANCES: NOT PRESENT
ORIENTATION AND CLOUDING OF SENSORIUM: CANNOT DO SERIAL ADDITIONS OR IS UNCERTAIN ABOUT DATE
VISUAL DISTURBANCES: NOT PRESENT
ANXIETY: MILDLY ANXIOUS
AGITATION: NORMAL ACTIVITY
ANXIETY: MILDLY ANXIOUS
TREMOR: NOT VISIBLE, BUT CAN BE FELT FINGERTIP TO FINGERTIP
ANXIETY: 5
AGITATION: NORMAL ACTIVITY
AUDITORY DISTURBANCES: NOT PRESENT
ANXIETY: 3
VISUAL DISTURBANCES: NOT PRESENT
ORIENTATION AND CLOUDING OF SENSORIUM: ORIENTED AND CAN DO SERIAL ADDITIONS
TACTILE DISTURBANCES: VERY MILD ITCHING, PINS AND NEEDLES, BURNING OR NUMBNESS
VISUAL DISTURBANCES: NOT PRESENT
NAUSEA AND VOMITING: NO NAUSEA AND NO VOMITING
PAROXYSMAL SWEATS: NO SWEAT VISIBLE
VISUAL DISTURBANCES: NOT PRESENT
TREMOR: NOT VISIBLE, BUT CAN BE FELT FINGERTIP TO FINGERTIP
PULSE: 92
AUDITORY DISTURBANCES: NOT PRESENT
NAUSEA AND VOMITING: 2
AUDITORY DISTURBANCES: NOT PRESENT
TOTAL SCORE: 7
NAUSEA AND VOMITING: MILD NAUSEA WITH NO VOMITING
AUDITORY DISTURBANCES: NOT PRESENT
PAROXYSMAL SWEATS: NO SWEAT VISIBLE
AGITATION: NORMAL ACTIVITY
TREMOR: 2
AUDIT-C TOTAL SCORE: 12
HEADACHE, FULLNESS IN HEAD: VERY MILD
HEADACHE, FULLNESS IN HEAD: NOT PRESENT
ANXIETY: 5
AUDITORY DISTURBANCES: NOT PRESENT
TOTAL SCORE: 3
TREMOR: NOT VISIBLE, BUT CAN BE FELT FINGERTIP TO FINGERTIP
TOTAL SCORE: 3
PAROXYSMAL SWEATS: NO SWEAT VISIBLE
AUDITORY DISTURBANCES: NOT PRESENT
ORIENTATION AND CLOUDING OF SENSORIUM: ORIENTED AND CAN DO SERIAL ADDITIONS
HEADACHE, FULLNESS IN HEAD: MILD
TOTAL SCORE: 5
TOTAL SCORE: 8
ANXIETY: 2
AUDITORY DISTURBANCES: NOT PRESENT
SKIP TO QUESTIONS 9-10: 0
TREMOR: NOT VISIBLE, BUT CAN BE FELT FINGERTIP TO FINGERTIP
NAUSEA AND VOMITING: MILD NAUSEA WITH NO VOMITING
HOW OFTEN DO YOU HAVE 6 OR MORE DRINKS ON ONE OCCASION: DAILY OR ALMOST DAILY
HOW MANY STANDARD DRINKS CONTAINING ALCOHOL DO YOU HAVE ON A TYPICAL DAY: 10 OR MORE
ORIENTATION AND CLOUDING OF SENSORIUM: ORIENTED AND CAN DO SERIAL ADDITIONS
NAUSEA AND VOMITING: MILD NAUSEA WITH NO VOMITING
AGITATION: NORMAL ACTIVITY
TOTAL SCORE: 5
VISUAL DISTURBANCES: NOT PRESENT
NAUSEA AND VOMITING: NO NAUSEA AND NO VOMITING
PAROXYSMAL SWEATS: NO SWEAT VISIBLE
HEADACHE, FULLNESS IN HEAD: VERY MILD
HEADACHE, FULLNESS IN HEAD: MILD
NAUSEA AND VOMITING: 2
AUDITORY DISTURBANCES: NOT PRESENT
ANXIETY: MILDLY ANXIOUS
NAUSEA AND VOMITING: MILD NAUSEA WITH NO VOMITING
ANXIETY: 2
TREMOR: NOT VISIBLE, BUT CAN BE FELT FINGERTIP TO FINGERTIP
ORIENTATION AND CLOUDING OF SENSORIUM: ORIENTED AND CAN DO SERIAL ADDITIONS
ORIENTATION AND CLOUDING OF SENSORIUM: ORIENTED AND CAN DO SERIAL ADDITIONS
TACTILE DISTURBANCES: MILD ITCHING, PINS AND NEEDLES, BURNING OR NUMBNESS
TOTAL SCORE: 2
VISUAL DISTURBANCES: NOT PRESENT
NAUSEA AND VOMITING: MILD NAUSEA WITH NO VOMITING
AUDITORY DISTURBANCES: NOT PRESENT
ORIENTATION AND CLOUDING OF SENSORIUM: ORIENTED AND CAN DO SERIAL ADDITIONS
TREMOR: NOT VISIBLE, BUT CAN BE FELT FINGERTIP TO FINGERTIP
AGITATION: NORMAL ACTIVITY

## 2025-05-06 ASSESSMENT — COGNITIVE AND FUNCTIONAL STATUS - GENERAL
DAILY ACTIVITIY SCORE: 24
DAILY ACTIVITIY SCORE: 24
MOBILITY SCORE: 24
MOBILITY SCORE: 24
PATIENT BASELINE BEDBOUND: NO
MOBILITY SCORE: 24

## 2025-05-06 ASSESSMENT — PATIENT HEALTH QUESTIONNAIRE - PHQ9
2. FEELING DOWN, DEPRESSED OR HOPELESS: NOT AT ALL
SUM OF ALL RESPONSES TO PHQ9 QUESTIONS 1 & 2: 0
1. LITTLE INTEREST OR PLEASURE IN DOING THINGS: NOT AT ALL

## 2025-05-06 ASSESSMENT — PAIN SCALES - GENERAL
PAINLEVEL_OUTOF10: 0 - NO PAIN
PAINLEVEL_OUTOF10: 0 - NO PAIN

## 2025-05-06 ASSESSMENT — PAIN - FUNCTIONAL ASSESSMENT
PAIN_FUNCTIONAL_ASSESSMENT: 0-10
PAIN_FUNCTIONAL_ASSESSMENT: 0-10

## 2025-05-06 NOTE — CARE PLAN
Problem: Pain - Adult  Goal: Verbalizes/displays adequate comfort level or baseline comfort level  5/6/2025 0346 by Julia Morgan RN  Outcome: Progressing  5/6/2025 0344 by Julia Morgan RN  Outcome: Progressing  5/6/2025 0343 by Julia Morgan RN  Outcome: Progressing  5/6/2025 0343 by Julia Morgan RN  Outcome: Progressing     Problem: Safety - Adult  Goal: Free from fall injury  5/6/2025 0346 by Julia Morgan RN  Outcome: Progressing  5/6/2025 0344 by Julia Morgan RN  Outcome: Progressing  5/6/2025 0343 by Julia Morgan RN  Outcome: Progressing  5/6/2025 0343 by Julia Morgan RN  Outcome: Progressing     Problem: Discharge Planning  Goal: Discharge to home or other facility with appropriate resources  5/6/2025 0346 by Julia Morgan RN  Outcome: Progressing  5/6/2025 0344 by Julia Morgan RN  Outcome: Progressing  5/6/2025 0343 by Julia Morgan RN  Outcome: Progressing  5/6/2025 0343 by Julia Morgan RN  Outcome: Progressing   The patient's goals for the shift include      The clinical goals for the shift include

## 2025-05-06 NOTE — PROGRESS NOTES
5/6/2025 9:24 AM I met with patient. She lives with her parents. She said that she was at an alcohol treatment facility in [place]  about a year ago but is not currently involved in a treatment program. She is interested in a female IOP group. Patient referred to Son[facility] Anam[facility]

## 2025-05-06 NOTE — CARE PLAN
Problem: Pain - Adult  Goal: Verbalizes/displays adequate comfort level or baseline comfort level  5/6/2025 0343 by Julia Morgan RN  Outcome: Progressing  5/6/2025 0343 by Julia Morgan RN  Outcome: Progressing     Problem: Safety - Adult  Goal: Free from fall injury  5/6/2025 0343 by Julia Morgan RN  Outcome: Progressing  5/6/2025 0343 by Julia Morgan RN  Outcome: Progressing     Problem: Discharge Planning  Goal: Discharge to home or other facility with appropriate resources  5/6/2025 0343 by Julia Morgan RN  Outcome: Progressing  5/6/2025 0343 by Julia Morgan RN  Outcome: Progressing     Problem: Chronic Conditions and Co-morbidities  Goal: Patient's chronic conditions and co-morbidity symptoms are monitored and maintained or improved  5/6/2025 0343 by Julia Morgan RN  Outcome: Progressing  5/6/2025 0343 by Julia Morgan RN  Outcome: Progressing     Problem: Nutrition  Goal: Nutrient intake appropriate for maintaining nutritional needs  5/6/2025 0343 by Julia Morgan RN  Outcome: Progressing  5/6/2025 0343 by Julia Morgan RN  Outcome: Progressing   The patient's goals for the shift include      The clinical goals for the shift include

## 2025-05-06 NOTE — PROGRESS NOTES
David Street was assessed by a Substance Use Navigator Specialist (SUNS) at 11:46 AM     Contact Number obtained during encounter:     Medical History: Medical History[1]     Psychiatric History: depression and anxiety    Social History:   Social History     Socioeconomic History    Marital status: Single     Spouse name: Not on file    Number of children: Not on file    Years of education: Not on file    Highest education level: Not on file   Occupational History    Not on file   Tobacco Use    Smoking status: Every Day     Current packs/day: 0.50     Average packs/day: 0.5 packs/day for 25.3 years (12.7 ttl pk-yrs)     Types: Cigarettes     Start date: 2000    Smokeless tobacco: Never   Substance and Sexual Activity    Alcohol use: Yes     Alcohol/week: 20.0 standard drinks of alcohol     Types: 20 Glasses of wine per week    Drug use: Never    Sexual activity: Not on file   Other Topics Concern    Not on file   Social History Narrative    Not on file     Social Drivers of Health     Financial Resource Strain: Low Risk  (5/6/2025)    Overall Financial Resource Strain (CARDIA)     Difficulty of Paying Living Expenses: Not very hard   Food Insecurity: No Food Insecurity (5/6/2025)    Hunger Vital Sign     Worried About Running Out of Food in the Last Year: Never true     Ran Out of Food in the Last Year: Never true   Transportation Needs: No Transportation Needs (5/6/2025)    PRAPARE - Transportation     Lack of Transportation (Medical): No     Lack of Transportation (Non-Medical): No   Physical Activity: Not on file   Stress: Not on file   Social Connections: Not on file   Intimate Partner Violence: Not At Risk (5/6/2025)    Humiliation, Afraid, Rape, and Kick questionnaire     Fear of Current or Ex-Partner: No     Emotionally Abused: No     Physically Abused: No     Sexually Abused: No   Housing Stability: Low Risk  (5/6/2025)    Housing Stability Vital Sign     Unable to Pay for Housing in the Last Year: No      "Number of Times Moved in the Last Year: 0     Homeless in the Last Year: No        UDS / Tox panel results:     Substance(s) used: Alcohol. Amount (shots/glasses/beers per day/week, etc.) : wine, alcohol. Frequency: daily. Last used: 5/4/2025. Hx of withdrawal sx: yes. Hx of seizures: yes. Hx of delirium tremens: \"somewhat\".     Brief Summary of Assessment: SUN met with patient on this visit.  Patient was open with establishing rapport but was uncomfortable due to \"experiencing withdrawals\".  Patient reported having a seizure, \"this is the first time this happened\".  She shared that she experiences anxiety and depression and is seen by a psychiatrist.      Diagnosis / Diagnostic Impression: Alcohol Use Disorder    Summary / Plan: patient requested outpatient services for women    Patient interested in treatment: Yes    home, see patient instructions for treatment and plan    Transportation Provided: No     COLT PINEDA         [1] No past medical history on file.    "

## 2025-05-06 NOTE — CARE PLAN
Problem: Pain - Adult  Goal: Verbalizes/displays adequate comfort level or baseline comfort level  5/6/2025 0344 by Julia Morgan RN  Outcome: Progressing  5/6/2025 0343 by Julia Morgan RN  Outcome: Progressing  5/6/2025 0343 by Julia Morgan RN  Outcome: Progressing     Problem: Safety - Adult  Goal: Free from fall injury  5/6/2025 0344 by Julia Morgan RN  Outcome: Progressing  5/6/2025 0343 by Julia Morgan RN  Outcome: Progressing  5/6/2025 0343 by Julia Morgan RN  Outcome: Progressing     Problem: Discharge Planning  Goal: Discharge to home or other facility with appropriate resources  5/6/2025 0344 by Julia Morgan RN  Outcome: Progressing  5/6/2025 0343 by Julia Morgan RN  Outcome: Progressing  5/6/2025 0343 by Julia Morgan RN  Outcome: Progressing     Problem: Chronic Conditions and Co-morbidities  Goal: Patient's chronic conditions and co-morbidity symptoms are monitored and maintained or improved  5/6/2025 0344 by Julia Morgan RN  Outcome: Progressing  5/6/2025 0343 by Julia Morgan RN  Outcome: Progressing  5/6/2025 0343 by Julia Morgan RN  Outcome: Progressing     Problem: Nutrition  Goal: Nutrient intake appropriate for maintaining nutritional needs  5/6/2025 0344 by Julia Morgan RN  Outcome: Progressing  5/6/2025 0343 by Julia Morgan RN  Outcome: Progressing  5/6/2025 0343 by Julia Morgan RN  Outcome: Progressing   The patient's goals for the shift include      The clinical goals for the shift include
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
## 2025-05-06 NOTE — CARE PLAN
The patient's goals for the shift include      The clinical goals for the shift include        Problem: Pain - Adult  Goal: Verbalizes/displays adequate comfort level or baseline comfort level  Outcome: Progressing     Problem: Safety - Adult  Goal: Free from fall injury  Outcome: Progressing     Problem: Discharge Planning  Goal: Discharge to home or other facility with appropriate resources  Outcome: Progressing     Problem: Chronic Conditions and Co-morbidities  Goal: Patient's chronic conditions and co-morbidity symptoms are monitored and maintained or improved  Outcome: Progressing     Problem: Nutrition  Goal: Nutrient intake appropriate for maintaining nutritional needs  Outcome: Progressing

## 2025-05-06 NOTE — PROGRESS NOTES
05/06/25 1212   Discharge Planning   Living Arrangements Family members   Support Systems Family members;Spouse/significant other   Assistance Needed SW has met with patient and contacted [facility] for IOP inforation, patient admitted for new onset seizure. home no needs when med ready for discharge.   Type of Residence Private residence   Home or Post Acute Services None   Expected Discharge Disposition Home   Does the patient need discharge transport arranged? No   Financial Resource Strain   How hard is it for you to pay for the very basics like food, housing, medical care, and heating? Not hard   Housing Stability   In the last 12 months, was there a time when you were not able to pay the mortgage or rent on time? N   In the past 12 months, how many times have you moved where you were living? 0   At any time in the past 12 months, were you homeless or living in a shelter (including now)? N   Transportation Needs   In the past 12 months, has lack of transportation kept you from medical appointments or from getting medications? no   In the past 12 months, has lack of transportation kept you from meetings, work, or from getting things needed for daily living? No

## 2025-05-06 NOTE — PROGRESS NOTES
Pharmacy Medication History Review   Spoke to the patient    David Street is a 39 y.o. female admitted for Alcohol withdrawal seizure with complication (Multi). Pharmacy reviewed the patient's [id number] medications and allergies for accuracy.    The list below reflectives the updated PTA list. Please review each medication in order reconciliation for additional clarification and justification.     Prior to Admission Medications   Prescriptions Last Dose   Vyvanse 20 mg capsule 5/5/2025   Sig: Take 1 capsule (20 mg) by mouth once daily in the morning.   buPROPion XL (Wellbutrin XL) 150 mg 24 hr tablet 5/5/2025   Sig: Take 2 tablets (300 mg) by mouth once daily.   estradiol (Vivelle-DOT) 0.025 mg/24 hr patch    Sig: Place 1 patch on the skin 2 times a week.   famotidine (Pepcid) 20 mg tablet    Sig: Take 1 tablet (20 mg) by mouth once daily as needed.   metoprolol succinate XL (Toprol-XL) 25 mg 24 hr tablet 5/5/2025   Sig: Take 1 tablet (25 mg) by mouth once daily.   multivitamin capsule 5/5/2025   Sig: Take 1 capsule by mouth once daily.   progesterone (Prometrium) 100 mg capsule 5/5/2025   Sig: Take 1 capsule (100 mg) by mouth.   traZODone (Desyrel) 50 mg tablet Past Week   Sig: Take 1 tablet (50 mg) by mouth if needed for sleep.   vilazodone (Viibryd) 10 mg tablet 5/5/2025   Sig: Take 1 tablet (10 mg) by mouth early in the morning..      Facility-Administered Medications: None       The list below reflectives the updated allergy list. Please review each documented allergy for additional clarification and justification.  Allergies  Reviewed by Madie Hodges on 5/6/2025        Severity Reactions Comments    Duloxetine Not Specified GI intolerance, GI Upset Dizziness and blurred vision    Gabapentin Not Specified GI intolerance, Unknown Dizziness and blurred vision    Miconazole Not Specified Swelling Other Reaction(s): Pain    Tramadol Not Specified Nausea/vomiting Other Reaction(s): Vomiting     Sulfamethoxazole-trimethoprim Low Diarrhea, GI intolerance, GI Upset, Nausea/vomiting Other Reaction(s): GI Intolerance    Tioconazole Low Swelling Vaginal swelling and pain Other Reaction(s): Other: See Comments      Vaginal swelling and pain            Below are additional concerns with the patient's PTA list.      Madie Hodges

## 2025-05-06 NOTE — PROGRESS NOTES
"Subjective:  Patient as laying in bed. Patient said she was having some anxiety but her tremors are getting better. She mentioned that she went to the restroom yesterday and fell and hit her head but did not lose consciousness and thinks she had seizure. She did not have any episodes after that. This was the first time she had seizure like episode.   Patient mentioned of being lonely and has no one to speak to and understand her medical issues and so she takes help of alcohol for her symptoms and loneliness. Patient is open to getting help and has had gotten counseling in past and did go to rehab but relapsed again. Patient denies any dizziness, headache, chest pain, SOB      Vitals (Last 24 Hours):  Heart Rate:  []   Temp:  [36.2 °C (97.2 °F)-36.8 °C (98.2 °F)]   Resp:  [17-26]   BP: (114-142)/()   Height:  [162.6 cm (5' 4\")]   Weight:  [56.7 kg (125 lb)]   SpO2:  [95 %-99 %]       I have reviewed imaging reports and labs from this visit    PHYSICAL EXAM:  Constitutional: NAD, alert and cooperative  Eyes: no icterus  ENMT: mucous membranes moist, no lesions  Head/Neck: supple  Respiratory/Thorax: CTA bilaterally, non-labored breathing, no cough, on RA  Cardiovascular: RRR, no murmurs heard  Gastrointestinal: ND/S/NT  : no Flaherty, no SP/flank discomfort  Musculoskeletal: no joint swelling, ROM intact  Extremities: no edema  Neurological: non-focal  Skin: warm and dry  Psych: calm, stable mood     MEDS:  Scheduled meds  bupropion XL, 150 mg, oral, Daily  folic acid, 1 mg, oral, Daily  metoprolol succinate XL, 25 mg, oral, Daily  multivitamin with minerals, 1 tablet, oral, Daily  pantoprazole, 40 mg, oral, Daily before breakfast   Or  pantoprazole, 40 mg, intravenous, Daily before breakfast  [START ON 5/7/2025] PHENobarbital, 64.8 mg, oral, TID   Followed by  [START ON 5/8/2025] PHENobarbital, 32.4 mg, oral, TID  PHENobarbital, 1.8 mg/kg (Ideal), oral, q3h  thiamine, 100 mg, intravenous, " Daily  vilazodone, 10 mg, oral, Daily      Continuous meds         PRN meds  PRN medications: acetaminophen **OR** acetaminophen **OR** acetaminophen, ondansetron **OR** ondansetron, PHENobarbital, polyethylene glycol      ASSESSMENT/PLAN:  David Street is a 39 y.o. 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  She states that after waking up yesterday she has been vomiting all day, has been sweaty and shaky.  She has been feeling very lightheaded and fell twice trying to go to the bathroom.  She thinks she fell because she was so weak and lightheaded and seizure like activity.  She denies loss of consciousness.  No diarrhea, had a normal bowel movement this morning.  Denies fevers, chest pain.  She does have mild shortness of breath.  Denies headache, vision changes, or other neurologic complaints.     Patient was really sad due to not having anyone to talk to and feels lonely, social work consulted.     Acute Alcohol withdrawal seizure with complication  Nausea and vomiting, unspecified vomiting type, Lightheaded  Last drink was 5/4  CT head No acute intracranial abnormality.  CT abd and Pelvis Diffuse hypoattenuation of the liver consistent with steatosis   EEG done awaiting results  MRI ordered  Patient was on prn Ativan for DT's which is d/howie and added Phenobarbital taper dose   Continue thiamine, 100 mg, intravenous, Daily, Multivitamin, Folic acid  Patient does not have any N/V at present, continue prn ondansetron if needed  Patient on telemetry  IV fluids discontinues, patient encouraged PO intake    Hx of POTS  - Continue Metoprolol    Hx of Depression  - Continue Bupropion  - Continue vilazodone    Hx of Freddy-Danlos syndrome (EDS)    - Patient follows up with pain  management    Other comorbidities as above  -continue medications as ordered and adjust based on clinical course     VTE / GI prophylaxis   -subcutaneous Lovenox, PPI, bowel regimen in place     Discharge planning  -Awaiting EEG interpretation and MRI results. Discharge home when medically ready    Discussed with  and the interdisciplinary team     MEDARDO Agee-CNP

## 2025-05-06 NOTE — H&P
History Of Present Illness  David Street is a 39 y.o. female presenting with dizziness vomiting and shaking.  39 y.o. 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  She states that after waking up today she has been vomiting all day, has been sweaty and shaky.  She has been feeling very lightheaded and fell twice trying to go to the bathroom.  She thinks she fell because she was so weak and lightheaded, is not sure if she fully passed out.  She denies loss of consciousness.  No diarrhea, had a normal bowel movement this morning.  Denies fevers, chest pain.  She does have mild shortness of breath.  Denies headache, vision changes, or other neurologic complaints.   Patient was tremulous on admission and it was 24 hours since she had her last alcoholic drink, she does admit to heavy consumption of alcohol.     Past Medical History  She has past medical history of excessive alcohol use, POTS, anxiety and depression, chronic arthritis perimenopause chronic dysphonia and laryngeal nodules    Surgical History  She has  past surgical history  of bilateral periacetabular osteotomies and L5-S1 fusion.     Social History  She reports that she has been smoking cigarettes. She started smoking about 25 years ago. She has a 12.7 pack-year smoking history. She has never used smokeless tobacco. She reports current alcohol use of about 20.0 standard drinks of alcohol per week. She reports that she does not use drugs.    Family History  Family History  Problem Relation Name Age of Onset    Alcohol abuse Mother      Alcohol abuse Maternal Grandmother          Allergies  Duloxetine, Gabapentin, Miconazole, Tramadol, Sulfamethoxazole-trimethoprim, and Tioconazole    Review of Systems  COMPLETE REVIEW OF SYSTEMS:    GENERAL:   SEE HPI, general weakness, dizziness anxiety and depression  HEENT: Negative for frequent or significant headaches, No changes in hearing or vision, no nose bleeds or other nasal problems  NECK: Negative for lumps, goiter, pain and significant neck swelling  RESPIRATORY: Negative for cough, wheezing or shortness of breath.  CARDIOVASCULAR: Positive history of POTS, negative for chest pain, leg swelling or palpitations.  GI: Negative for abdominal discomfort, blood in stools or black stools or change in bowel habits  : No history of dysuria, frequency or incontinence  GYN: Negative for abnormal vaginal bleeding, abnormal vaginal discharge.    MUSCULOSKELETAL: Positive for arthritis and periacetabular osteotomies and L5-S1 fusion.    SKIN: Negative for lesions, rash, and itching.  PSYCH: Positive for sleep disturbance, mood disorder and anxiety and depression   HEMATOLOGY/LYMPHOLOGY: Negative for prolonged bleeding, bruising easily or swollen nodes.  ENDOCRINE: Negative for cold or heat intolerance, polyuria, polydipsia and goiter.  NEURO: Positive history of falls, no history of headaches, paralysis, seizures or tremors  All other reviewed and negative other than HPI.           Physical Exam  GENERAL: Awake, alert, no distress, cooperative, afebrile, depressed and anxious  SKIN: Skin color, texture, turgor normal. No rashes or lesions.  HEAD/SINUSES: No significant findings.  EYES: PERRLA and EOMI  EARS: external ears normal  NOSE:  Septum midline.  OROPHARYNX: Lips, mucosa, and tongue normal. Teeth and gums normal. Oropharynx normal.  NECK: no jugulovenous distention, no carotid bruits, carotid pulse normal contour, supple  BACK:  No CVAT.  LUNGS: Lungs clear to auscultation. Good diaphragmatic excursion.  CARDIAC: Rate and rhythm regular.  Normal S1 and S2; no rubs, murmurs, or gallops  ABDOMEN: Abdomen soft, non-tender. BS normal. No masses or organomegaly.  EXTREMITIES: Extremities normal. No deformities,  edema, clubbing or skin discoloration.  NEURO: Gait not examined. nonfocal and tremulous.  PULSES: 2+ radial, 2+ carotid  RECTAL: not done  The remainder of the physical exam is noncontributory.          Last Recorded Vitals  BP (!) 137/93 (BP Location: Right arm, Patient Position: Lying)   Pulse 93   Temp 36.7 °C (98.1 °F) (Temporal)   Resp 17   Wt 56.7 kg (125 lb)   SpO2 99%     Relevant Results   Scheduled medications  buPROPion XL, 150 mg, oral, Daily  folic acid, 1 mg, oral, Daily  metoprolol succinate XL, 25 mg, oral, Daily  multivitamin with minerals, 1 tablet, oral, Daily  pantoprazole, 40 mg, oral, Daily before breakfast   Or  pantoprazole, 40 mg, intravenous, Daily before breakfast  [START ON 5/7/2025] PHENobarbital, 64.8 mg, oral, TID   Followed by  [START ON 5/8/2025] PHENobarbital, 32.4 mg, oral, TID  PHENobarbital, 1.8 mg/kg (Ideal), oral, q3h  thiamine, 100 mg, intravenous, Daily  vilazodone, 10 mg, oral, Daily    Continuous medications  Continuous Medications[1]  PRN medications  PRN Medications[2]   Results for orders placed or performed during the hospital encounter of 05/05/25 (from the past 96 hours)   Lactate   Result Value Ref Range    Lactate 4.3 (HH) 0.4 - 2.0 mmol/L   Lipase   Result Value Ref Range    Lipase 45 9 - 82 U/L   Comprehensive Metabolic Panel   Result Value Ref Range    Glucose 138 (H) 74 - 99 mg/dL    Sodium 138 136 - 145 mmol/L    Potassium 3.7 3.5 - 5.3 mmol/L    Chloride 94 (L) 98 - 107 mmol/L    Bicarbonate 22 21 - 32 mmol/L    Anion Gap 26 (H) 10 - 20 mmol/L    Urea Nitrogen 17 6 - 23 mg/dL    Creatinine 0.56 0.50 - 1.05 mg/dL    eGFR >90 >60 mL/min/1.73m*2    Calcium 10.5 (H) 8.6 - 10.3 mg/dL    Albumin 5.6 (H) 3.4 - 5.0 g/dL    Alkaline Phosphatase 52 33 - 110 U/L    Total Protein 8.4 (H) 6.4 - 8.2 g/dL    AST 62 (H) 9 - 39 U/L    Bilirubin, Total 1.4 (H) 0.0 - 1.2 mg/dL    ALT 56 (H) 7 - 45 U/L   CBC and Auto Differential   Result Value Ref Range    WBC 10.4 4.4 -  11.3 x10*3/uL    nRBC 0.0 0.0 - 0.0 /100 WBCs    RBC 4.06 4.00 - 5.20 x10*6/uL    Hemoglobin 13.2 12.0 - 16.0 g/dL    Hematocrit 39.1 36.0 - 46.0 %    MCV 96 80 - 100 fL    MCH 32.5 26.0 - 34.0 pg    MCHC 33.8 32.0 - 36.0 g/dL    RDW 12.6 11.5 - 14.5 %    Platelets 229 150 - 450 x10*3/uL    Neutrophils % 84.6 40.0 - 80.0 %    Immature Granulocytes %, Automated 0.3 0.0 - 0.9 %    Lymphocytes % 8.3 13.0 - 44.0 %    Monocytes % 6.3 2.0 - 10.0 %    Eosinophils % 0.0 0.0 - 6.0 %    Basophils % 0.5 0.0 - 2.0 %    Neutrophils Absolute 8.80 (H) 1.20 - 7.70 x10*3/uL    Immature Granulocytes Absolute, Automated 0.03 0.00 - 0.70 x10*3/uL    Lymphocytes Absolute 0.86 (L) 1.20 - 4.80 x10*3/uL    Monocytes Absolute 0.66 0.10 - 1.00 x10*3/uL    Eosinophils Absolute 0.00 0.00 - 0.70 x10*3/uL    Basophils Absolute 0.05 0.00 - 0.10 x10*3/uL   Acetaminophen level   Result Value Ref Range    Acetaminophen <10.0 10.0 - 30.0 ug/mL   hCG, Urine, Qualitative   Result Value Ref Range    HCG, Urine NEGATIVE NEGATIVE   Urinalysis with Reflex Culture and Microscopic   Result Value Ref Range    Color, Urine Yellow Straw, Yellow    Appearance, Urine Hazy (N) Clear    Specific Gravity, Urine 1.015 1.005 - 1.035    pH, Urine 8.5 (N) 5.0, 5.5, 6.0, 6.5, 7.0, 7.5, 8.0    Protein, Urine 30 (1+) (A) NEGATIVE, 10 (TRACE) mg/dL    Glucose, Urine NEGATIVE NEGATIVE mg/dL    Blood, Urine NEGATIVE NEGATIVE mg/dL    Ketones, Urine 150 (4+) (A) NEGATIVE mg/dL    Bilirubin, Urine NEGATIVE NEGATIVE mg/dL    Urobilinogen, Urine NORM NORM mg/dL    Nitrite, Urine 2+ (A) NEGATIVE    Leukocyte Esterase, Urine NEGATIVE NEGATIVE   Microscopic Only, Urine   Result Value Ref Range    WBC, Urine NONE 1-5, NONE /HPF    RBC, Urine 1-2 NONE, 1-2, 3-5 /HPF    Squamous Epithelial Cells, Urine 1-9 (SPARSE) Reference range not established. /HPF    Bacteria, Urine 1+ (A) NONE SEEN /HPF   Lactate   Result Value Ref Range    Lactate 17.4 (HH) 0.4 - 2.0 mmol/L   ECG 12 lead    Result Value Ref Range    Ventricular Rate 109 BPM    Atrial Rate 109 BPM    LA Interval 92 ms    QRS Duration 92 ms    QT Interval 322 ms    QTC Calculation(Bazett) 433 ms    P Axis 63 degrees    R Axis 7 degrees    T Axis 48 degrees    QRS Count 17 beats    Q Onset 223 ms    P Onset 177 ms    P Offset 217 ms    T Offset 384 ms    QTC Fredericia 392 ms   Lactate   Result Value Ref Range    Lactate 1.1 0.4 - 2.0 mmol/L   CBC   Result Value Ref Range    WBC 5.1 4.4 - 11.3 x10*3/uL    nRBC 0.0 0.0 - 0.0 /100 WBCs    RBC 3.37 (L) 4.00 - 5.20 x10*6/uL    Hemoglobin 11.2 (L) 12.0 - 16.0 g/dL    Hematocrit 32.5 (L) 36.0 - 46.0 %    MCV 96 80 - 100 fL    MCH 33.2 26.0 - 34.0 pg    MCHC 34.5 32.0 - 36.0 g/dL    RDW 13.0 11.5 - 14.5 %    Platelets 175 150 - 450 x10*3/uL   Comprehensive metabolic panel   Result Value Ref Range    Glucose 82 74 - 99 mg/dL    Sodium 137 136 - 145 mmol/L    Potassium 3.2 (L) 3.5 - 5.3 mmol/L    Chloride 100 98 - 107 mmol/L    Bicarbonate 23 21 - 32 mmol/L    Anion Gap 17 10 - 20 mmol/L    Urea Nitrogen 13 6 - 23 mg/dL    Creatinine 0.48 (L) 0.50 - 1.05 mg/dL    eGFR >90 >60 mL/min/1.73m*2    Calcium 9.6 8.6 - 10.3 mg/dL    Albumin 4.5 3.4 - 5.0 g/dL    Alkaline Phosphatase 40 33 - 110 U/L    Total Protein 6.8 6.4 - 8.2 g/dL    AST 37 9 - 39 U/L    Bilirubin, Total 1.2 0.0 - 1.2 mg/dL    ALT 37 7 - 45 U/L    ECG 12 lead  Result Date: 5/6/2025  Sinus tachycardia with short LA Incomplete right bundle branch block Borderline ECG No previous ECGs available    CT abdomen pelvis w IV contrast  Result Date: 5/5/2025  Interpreted By:  Yayo Dotson, STUDY: CT ABDOMEN PELVIS W IV CONTRAST;  5/5/2025 10:00 pm   INDICATION: Signs/Symptoms:Abdominal pain with nausea and vomiting..     COMPARISON: None.   ACCESSION NUMBER(S): [id number]   ORDERING CLINICIAN: BENJY LONGO   TECHNIQUE: Contiguous axial images of the abdomen and pelvis were obtained after the intravenous administration of iodinated  contrast. Coronal and sagittal reformatted images were reconstructed from the axial data.   FINDINGS: LOWER CHEST: No acute abnormality. Subsegmental bibasilar atelectasis.     ABDOMEN/PELVIS:   ABDOMINAL WALL: No significant abnormality.   LIVER: Diffuse hypoattenuation of the liver consistent with steatosis.   BILE DUCTS: No suspicious lesions.   GALLBLADDER: No significant abnormality.   PANCREAS: No significant abnormality.   SPLEEN: No significant abnormality.   ADRENALS: No significant abnormality.   KIDNEYS, URETERS, BLADDER: Kidneys are normal in size and enhancement. No hydronephrosis. The urinary bladder wall is normal in thickness. There gas in the urinary bladder lumen.   REPRODUCTIVE ORGANS: No significant abnormality.   VESSELS: No significant abnormality.   RETROPERITONEUM/LYMPH NODES: No acute retroperitoneal abnormality. No enlarged lymph nodes.   BOWEL/PERITONEUM: Stomach is moderately distended with predominantly air in small amount of fluid. No inflammatory bowel wall thickening or dilatation. Normal appendix.   No ascites, free air, or fluid collection.     MUSCULOSKELETAL: No acute osseous abnormality. Chronic fracture deformity of the right hemipelvis involving the right iliopectineal junction, superior and posterior acetabulum. There is internal fixation of the left iliac crest without evidence of hardware complication. There postsurgical changes of L5-S1 posterior fusion and L5 laminectomy and discectomy at L5-S1. There is a right paracentral calcified disc extrusion that does not appear to directly affected descending right S1 nerve root. No suspicious osseous lesion.       Nonspecific moderate distention of the stomach with predominantly air and small amount of fluid.   Gas in the urinary bladder lumen without wall thickening. Correlate for recent instrumentation; otherwise, cystitis of concern.   Chronic fracture deformities of the right hemipelvis, left ilium status post internal  fixation of the left iliac crest..   Hepatic steatosis.   MACRO: None.   Signed by: Yayo Dotson 5/5/2025 10:23 PM Dictation workstation:   [id number]    CT head wo IV contrast  Result Date: 5/5/2025  Interpreted By:  Yayo Dotson, STUDY: CT HEAD WO IV CONTRAST;  5/5/2025 10:00 pm   INDICATION: Signs/Symptoms:Abdominal pain with nausea vomiting and multiple falls..     COMPARISON: None.   ACCESSION NUMBER(S): [id number]   ORDERING CLINICIAN: BENJY LONGO   TECHNIQUE: Noncontrast axial CT images of head were obtained with coronal and sagittal reconstructed images.   FINDINGS: BRAIN PARENCHYMA: There is age disproportionate cerebral volume loss. No acute intraparenchymal hemorrhage or parenchymal evidence of acute large territory ischemic infarct. Gray-white matter distinction is preserved. No mass-effect.   VENTRICLES and EXTRA-AXIAL SPACES:  No acute extra-axial or intraventricular hemorrhage. No effacement of cerebral sulci. The ventricles and sulci are age-concordant.   PARANASAL SINUSES/MASTOIDS:  No hemorrhage or air-fluid levels within the visualized paranasal sinuses. The mastoids are well aerated.   CALVARIUM/ORBITS:  No acute skull fracture.  The orbits and globes are intact to the extent visualized.   EXTRACRANIAL SOFT TISSUES: No discernible acute abnormality.       No acute intracranial abnormality.   Age-disproportionate cerebral volume loss. Please correlate with any known insults/risk factors.   MACRO: None.   Signed by: Yayo Dotson 5/5/2025 10:18 PM Dictation workstation:   [id number]    XR hip right with pelvis when performed 2 or 3 views  Result Date: 4/18/2025  Interpreted By:  Aldo Malcolm, STUDY: XR HIP RIGHT WITH PELVIS WHEN PERFORMED 2 OR 3 VIEWS   INDICATION: Signs/Symptoms:pain.   COMPARISON: November 20, 2024   ACCESSION NUMBER(S): [id number]   ORDERING CLINICIAN: LUCIANO REID   FINDINGS: Postsurgical changes of bilateral periacetabular osteotomy.   On the right there  is no evidence of fracture. The joint space normal.       Postsurgical changes of bilateral periacetabular osteotomies. No acute findings right hip.   Signed by: Aldo Malcolm 4/18/2025 6:59 AM Dictation workstation:   [id number]     Assessment/Plan   Assessment & Plan  Alcohol withdrawal seizure with complication (Multi)  39-year-old female with history of excessive alcohol consumption, anxiety and depression, POTS presents with nausea vomiting and dizziness and shaking and possible alcohol withdrawal seizure.  Noted to be severely acidotic with a lactate level of 4.1 .    1.  Alcohol withdrawal seizures  2.  History of excessive alcohol consumption and alcohol withdrawal  3.  History of POTS  4.  History of anxiety and depression  5.  Chronic arthritis and previous bilateral acetabular osteotomies  Admitted to medicine, started on alcohol withdrawal protocol, IV fluid IV thiamine initially was started on lorazepam which has been changed to phenobarbital.  EEG has been completed, results are pending  MRI of the brain was done which revealed no acute intracranial pathology and neurology consultation appreciated.  Patient will need alcohol counseling and advised smoking cessation.  DVT prophylaxis with SCDs     Total time spent in examination counseling coordinating care for this patient was greater than 75 minutes.    Gregorio Remy MD         [1]    [2] PRN medications: acetaminophen **OR** acetaminophen **OR** acetaminophen, ondansetron **OR** ondansetron, PHENobarbital, polyethylene glycol

## 2025-05-07 VITALS
WEIGHT: 125 LBS | RESPIRATION RATE: 19 BRPM | HEIGHT: 64 IN | SYSTOLIC BLOOD PRESSURE: 140 MMHG | OXYGEN SATURATION: 97 % | BODY MASS INDEX: 21.34 KG/M2 | DIASTOLIC BLOOD PRESSURE: 103 MMHG | TEMPERATURE: 98.2 F | HEART RATE: 89 BPM

## 2025-05-07 PROBLEM — R56.9 ALCOHOL WITHDRAWAL SEIZURE WITH COMPLICATION (MULTI): Status: RESOLVED | Noted: 2025-05-05 | Resolved: 2025-05-07

## 2025-05-07 PROBLEM — F10.939 ALCOHOL WITHDRAWAL SEIZURE WITH COMPLICATION (MULTI): Status: RESOLVED | Noted: 2025-05-05 | Resolved: 2025-05-07

## 2025-05-07 LAB
ANION GAP SERPL CALC-SCNC: 19 MMOL/L (ref 10–20)
BUN SERPL-MCNC: 7 MG/DL (ref 6–23)
CALCIUM SERPL-MCNC: 10.2 MG/DL (ref 8.6–10.3)
CHLORIDE SERPL-SCNC: 99 MMOL/L (ref 98–107)
CO2 SERPL-SCNC: 21 MMOL/L (ref 21–32)
CREAT SERPL-MCNC: 0.47 MG/DL (ref 0.5–1.05)
EGFRCR SERPLBLD CKD-EPI 2021: >90 ML/MIN/1.73M*2
ERYTHROCYTE [DISTWIDTH] IN BLOOD BY AUTOMATED COUNT: 12.3 % (ref 11.5–14.5)
GLUCOSE SERPL-MCNC: 107 MG/DL (ref 74–99)
HCT VFR BLD AUTO: 38.5 % (ref 36–46)
HGB BLD-MCNC: 13 G/DL (ref 12–16)
MCH RBC QN AUTO: 33.4 PG (ref 26–34)
MCHC RBC AUTO-ENTMCNC: 33.8 G/DL (ref 32–36)
MCV RBC AUTO: 99 FL (ref 80–100)
NRBC BLD-RTO: 0 /100 WBCS (ref 0–0)
PLATELET # BLD AUTO: 193 X10*3/UL (ref 150–450)
POTASSIUM SERPL-SCNC: 3.5 MMOL/L (ref 3.5–5.3)
RBC # BLD AUTO: 3.89 X10*6/UL (ref 4–5.2)
SODIUM SERPL-SCNC: 135 MMOL/L (ref 136–145)
WBC # BLD AUTO: 4.6 X10*3/UL (ref 4.4–11.3)

## 2025-05-07 PROCEDURE — 85027 COMPLETE CBC AUTOMATED: CPT | Performed by: INTERNAL MEDICINE

## 2025-05-07 PROCEDURE — 2500000001 HC RX 250 WO HCPCS SELF ADMINISTERED DRUGS (ALT 637 FOR MEDICARE OP): Performed by: INTERNAL MEDICINE

## 2025-05-07 PROCEDURE — 2500000004 HC RX 250 GENERAL PHARMACY W/ HCPCS (ALT 636 FOR OP/ED): Performed by: NURSE PRACTITIONER

## 2025-05-07 PROCEDURE — 36415 COLL VENOUS BLD VENIPUNCTURE: CPT | Performed by: INTERNAL MEDICINE

## 2025-05-07 PROCEDURE — 80048 BASIC METABOLIC PNL TOTAL CA: CPT | Performed by: INTERNAL MEDICINE

## 2025-05-07 PROCEDURE — 2500000001 HC RX 250 WO HCPCS SELF ADMINISTERED DRUGS (ALT 637 FOR MEDICARE OP): Performed by: NURSE PRACTITIONER

## 2025-05-07 RX ADMIN — BUPROPION HYDROCHLORIDE 150 MG: 150 TABLET, EXTENDED RELEASE ORAL at 08:01

## 2025-05-07 RX ADMIN — Medication 1 TABLET: at 08:01

## 2025-05-07 RX ADMIN — FOLIC ACID 1 MG: 1 TABLET ORAL at 08:01

## 2025-05-07 RX ADMIN — PHENOBARBITAL 64.8 MG: 32.4 TABLET ORAL at 04:38

## 2025-05-07 RX ADMIN — PHENOBARBITAL 64.8 MG: 32.4 TABLET ORAL at 08:00

## 2025-05-07 RX ADMIN — THIAMINE HYDROCHLORIDE 100 MG: 100 INJECTION, SOLUTION INTRAMUSCULAR; INTRAVENOUS at 08:01

## 2025-05-07 RX ADMIN — METOPROLOL SUCCINATE 25 MG: 25 TABLET, EXTENDED RELEASE ORAL at 08:01

## 2025-05-07 RX ADMIN — PANTOPRAZOLE SODIUM 40 MG: 40 TABLET, DELAYED RELEASE ORAL at 06:15

## 2025-05-07 ASSESSMENT — LIFESTYLE VARIABLES
ANXIETY: 3
PAROXYSMAL SWEATS: NO SWEAT VISIBLE
HEADACHE, FULLNESS IN HEAD: NOT PRESENT
ORIENTATION AND CLOUDING OF SENSORIUM: ORIENTED AND CAN DO SERIAL ADDITIONS
TREMOR: NOT VISIBLE, BUT CAN BE FELT FINGERTIP TO FINGERTIP
AUDITORY DISTURBANCES: NOT PRESENT
TOTAL SCORE: 5
VISUAL DISTURBANCES: NOT PRESENT
PAROXYSMAL SWEATS: BARELY PERCEPTIBLE SWEATING, PALMS MOIST
PULSE: 89
AUDITORY DISTURBANCES: NOT PRESENT
HEADACHE, FULLNESS IN HEAD: NOT PRESENT
NAUSEA AND VOMITING: NO NAUSEA AND NO VOMITING
ANXIETY: 3
NAUSEA AND VOMITING: NO NAUSEA AND NO VOMITING
AGITATION: NORMAL ACTIVITY
TREMOR: NOT VISIBLE, BUT CAN BE FELT FINGERTIP TO FINGERTIP
TOTAL SCORE: 5
ORIENTATION AND CLOUDING OF SENSORIUM: ORIENTED AND CAN DO SERIAL ADDITIONS
AGITATION: SOMEWHAT MORE THAN NORMAL ACTIVITY
VISUAL DISTURBANCES: NOT PRESENT

## 2025-05-07 ASSESSMENT — COGNITIVE AND FUNCTIONAL STATUS - GENERAL
WALKING IN HOSPITAL ROOM: A LITTLE
MOBILITY SCORE: 22
CLIMB 3 TO 5 STEPS WITH RAILING: A LITTLE

## 2025-05-07 ASSESSMENT — PAIN SCALES - GENERAL: PAINLEVEL_OUTOF10: 4

## 2025-05-07 ASSESSMENT — PAIN - FUNCTIONAL ASSESSMENT: PAIN_FUNCTIONAL_ASSESSMENT: 0-10

## 2025-05-07 ASSESSMENT — PAIN DESCRIPTION - DESCRIPTORS: DESCRIPTORS: ACHING;SORE

## 2025-05-07 NOTE — ASSESSMENT & PLAN NOTE
39-year-old female with history of excessive alcohol consumption, anxiety and depression, POTS presents with nausea vomiting and dizziness and shaking and possible alcohol withdrawal seizure.  Noted to be severely acidotic with a lactate level of 4.1 .    1.  Alcohol withdrawal seizures  2.  History of excessive alcohol consumption and alcohol withdrawal  3.  History of POTS  4.  History of anxiety and depression  5.  Chronic arthritis and previous bilateral acetabular osteotomies  Admitted to medicine, started on alcohol withdrawal protocol, IV fluid IV thiamine initially was started on lorazepam which has been changed to phenobarbital.  EEG has been completed, results are pending  MRI of the brain was done which revealed no acute intracranial pathology and neurology consultation appreciated.  Patient will need alcohol counseling and advised smoking cessation.

## 2025-05-07 NOTE — NURSING NOTE
"Yulissa BATRES made aware that when RN went back to round on patient she was gone. Patient's belongings gone as well.  RN called patient's cell phone and she stated she \"needed to leave and it was taking too long\". RN stated that she needed patient to return to take IV out and patient stated \"I'm sorry\".  RN called patient's father to see if he was the transport and for him to bring patient back to take out IV.  Patient's father stated that he was not the transport and that he would try to get a hold of patient to return for IV removal.  "

## 2025-05-07 NOTE — CONSULTS
Consults    History Of Present Illness  David Street, a 39-year-old woman, came in with symptoms of dizziness, vomiting, and shaking. She has a medical history that includes Freddy-Danlos Syndrome (EDS), Postural Orthostatic Tachycardia Syndrome (POTS), perimenopause, depression and anxiety, vocal cord nodules with chronic dysphonia, chronic arthritic pain, and labral tears. She has also undergone bilateral periacetabular osteotomies and an L5-S1 fusion.    Today, after waking up, David experienced persistent vomiting, sweating, and shaking. She felt extremely lightheaded and fell twice while trying to go to the bathroom, attributing the falls to her weakness and dizziness. Although she isn't sure if she fully lost consciousness, she denies any blackout. She has not had diarrhea and had a normal bowel movement this morning. She also denies having fevers or chest pain but reports mild shortness of breath. There are no headaches, vision changes, or other neurological symptoms.    Upon admission, David was tremulous. It had been 24 hours since her last alcoholic drink, and she admitted to heavy alcohol consumption. In the Emergency Department (ED), she was suspected of having a seizure, which was not witnessed by her or her family but was observed by ED staff. There was no lateralizing sign and no history of seizures, leading to the suspicion that it was an alcohol withdrawal-related seizure. Consequently, she was not started on antiseizure medications.  Past Medical History  Medical History[1]  Surgical History  Surgical History[2]  Social History  Social History[3]  Allergies  Duloxetine, Gabapentin, Miconazole, Tramadol, Sulfamethoxazole-trimethoprim, and Tioconazole  Prescriptions Prior to Admission[4]    Review of Systems as noted in HPI  Neurological Exam The patient is alert and oriented x 4, extraocular movement full.  Saccades are unremarkable in velocity and amplitude.  Pursuits are unremarkable.  Vestibular  "ocular reflex is normal.  Visual field is full.  Pupils are reactive.  No nystagmus on primary or eccentric gaze or in  any head position with respect to gravity.  Face is symmetric bilaterally no abnormal involuntary face movements.  Facial sensations are intact bilaterally no abnormal involuntary facial movements appreciated.  Power intact in all 4 extremities proximally and distally.  Sensations intact to all 4 extremities to touch, tactile, sharps bilaterally in upper and lower extremities proximally and distally.  Reflexes are symmetric 1 bilaterally in both patella, ankle, biceps triceps and wrist.  Babinski is normal.    Physical Exam  Last Recorded Vitals  Blood pressure (!) 131/96, pulse 92, temperature 36.7 °C (98.1 °F), temperature source Temporal, resp. rate 19, height 1.626 m (5' 4\"), weight 56.7 kg (125 lb), SpO2 96%.    Relevant Results    Aaliyah Coma Scale  Best Eye Response: Spontaneous  Best Verbal Response: Oriented  Best Motor Response: Follows commands  Christopher Coma Scale Score: 15     I have personally reviewed the following imaging results:   Imaging  MR brain w and wo IV contrast  Result Date: 5/6/2025  1.  Unremarkable brain. No discernible seizure focus.     MACRO: None   Signed by: Pieter Enamorado 5/6/2025 8:17 PM Dictation workstation:   [id number]    CT abdomen pelvis w IV contrast  Result Date: 5/5/2025  Nonspecific moderate distention of the stomach with predominantly air and small amount of fluid.   Gas in the urinary bladder lumen without wall thickening. Correlate for recent instrumentation; otherwise, cystitis of concern.   Chronic fracture deformities of the right hemipelvis, left ilium status post internal fixation of the left iliac crest..   Hepatic steatosis.   MACRO: None.   Signed by: Yayo Dotson 5/5/2025 10:23 PM Dictation workstation:   [id number]    CT head wo IV contrast  Result Date: 5/5/2025  No acute intracranial abnormality.   Age-disproportionate cerebral " volume loss. Please correlate with any known insults/risk factors.   MACRO: None.   Signed by: Yayo Dotson 5/5/2025 10:18 PM Dictation workstation:   [id number]      Cardiology, Vascular, and Other Imaging  ECG 12 lead  Result Date: 5/6/2025  Sinus tachycardia with short [place] Incomplete right bundle branch block Borderline ECG No previous ECGs available    Results for orders placed or performed during the hospital encounter of 05/05/25 (from the past 24 hours)   CBC   Result Value Ref Range    WBC 5.1 4.4 - 11.3 x10*3/uL    nRBC 0.0 0.0 - 0.0 /100 WBCs    RBC 3.37 (L) 4.00 - 5.20 x10*6/uL    Hemoglobin 11.2 (L) 12.0 - 16.0 g/dL    Hematocrit 32.5 (L) 36.0 - 46.0 %    MCV 96 80 - 100 fL    MCH 33.2 26.0 - 34.0 pg    MCHC 34.5 32.0 - 36.0 g/dL    RDW 13.0 11.5 - 14.5 %    Platelets 175 150 - 450 x10*3/uL   Comprehensive metabolic panel   Result Value Ref Range    Glucose 82 74 - 99 mg/dL    Sodium 137 136 - 145 mmol/L    Potassium 3.2 (L) 3.5 - 5.3 mmol/L    Chloride 100 98 - 107 mmol/L    Bicarbonate 23 21 - 32 mmol/L    Anion Gap 17 10 - 20 mmol/L    Urea Nitrogen 13 6 - 23 mg/dL    Creatinine 0.48 (L) 0.50 - 1.05 mg/dL    eGFR >90 >60 mL/min/1.73m*2    Calcium 9.6 8.6 - 10.3 mg/dL    Albumin 4.5 3.4 - 5.0 g/dL    Alkaline Phosphatase 40 33 - 110 U/L    Total Protein 6.8 6.4 - 8.2 g/dL    AST 37 9 - 39 U/L    Bilirubin, Total 1.2 0.0 - 1.2 mg/dL    ALT 37 7 - 45 U/L     MR brain w and wo IV contrast  Result Date: 5/6/2025  Interpreted By:  Pieter Enamorado, STUDY: MR BRAIN W AND WO IV CONTRAST;  5/6/2025 6:02 pm   INDICATION: Signs/Symptoms:seizure.     COMPARISON: None.   ACCESSION NUMBER(S): [id number]   ORDERING CLINICIAN: ANNI GALLARDO   TECHNIQUE: T2, FLAIR, DWI, ADC, gradient echo T2, and T1 weighted images of brain were acquired without intravenous contrast administration. Multi planar T1 weighted imaging was acquired after administration of 10 ML Dotarem gadolinium based intravenous contrast.    FINDINGS: No intracranial space occupying lesions. No evidence of gray matter heterotopias or other neuronal migration abnormalities. No focal cortical dysplasias.  Normal adult myelination pattern is present. Midline structures including the cerebellar vermis, brainstem, and corpus callosum are well formed.   The hippocampal formations are symmetric, demonstrating normal volume and superior sulcation, without abnormal FLAIR signal is to suggest hippocampal sclerosis.   No intracranial hemorrhage. No extra-axial fluid collection. There is no abnormally restricted diffusion to suggest recent infarction. Major vascular flow voids are present.   No abnormal brain parenchymal or meningeal enhancement.   No paranasal sinus/ethmoid mucosal inflammatory changes. No nonspecific mastoid fluid. Orbital contents unremarkable. No aggressive appearing osseous lesions identified.         1.  Unremarkable brain. No discernible seizure focus.     MACRO: None   Signed by: Pieter Enamorado 5/6/2025 8:17 PM Dictation workstation:   [id number]    ECG 12 lead  Result Date: 5/6/2025  Sinus tachycardia with short [place] Incomplete right bundle branch block Borderline ECG No previous ECGs available    CT abdomen pelvis w IV contrast  Result Date: 5/5/2025  Interpreted By:  Yayo Dotson, STUDY: CT ABDOMEN PELVIS W IV CONTRAST;  5/5/2025 10:00 pm   INDICATION: Signs/Symptoms:Abdominal pain with nausea and vomiting..     COMPARISON: None.   ACCESSION NUMBER(S): [id number]   ORDERING CLINICIAN: BENJY LONGO   TECHNIQUE: Contiguous axial images of the abdomen and pelvis were obtained after the intravenous administration of iodinated contrast. Coronal and sagittal reformatted images were reconstructed from the axial data.   FINDINGS: LOWER CHEST: No acute abnormality. Subsegmental bibasilar atelectasis.     ABDOMEN/PELVIS:   ABDOMINAL WALL: No significant abnormality.   LIVER: Diffuse hypoattenuation of the liver consistent with steatosis.    BILE DUCTS: No suspicious lesions.   GALLBLADDER: No significant abnormality.   PANCREAS: No significant abnormality.   SPLEEN: No significant abnormality.   ADRENALS: No significant abnormality.   KIDNEYS, URETERS, BLADDER: Kidneys are normal in size and enhancement. No hydronephrosis. The urinary bladder wall is normal in thickness. There gas in the urinary bladder lumen.   REPRODUCTIVE ORGANS: No significant abnormality.   VESSELS: No significant abnormality.   RETROPERITONEUM/LYMPH NODES: No acute retroperitoneal abnormality. No enlarged lymph nodes.   BOWEL/PERITONEUM: Stomach is moderately distended with predominantly air in small amount of fluid. No inflammatory bowel wall thickening or dilatation. Normal appendix.   No ascites, free air, or fluid collection.     MUSCULOSKELETAL: No acute osseous abnormality. Chronic fracture deformity of the right hemipelvis involving the right iliopectineal junction, superior and posterior acetabulum. There is internal fixation of the left iliac crest without evidence of hardware complication. There postsurgical changes of L5-S1 posterior fusion and L5 laminectomy and discectomy at L5-S1. There is a right paracentral calcified disc extrusion that does not appear to directly affected descending right S1 nerve root. No suspicious osseous lesion.       Nonspecific moderate distention of the stomach with predominantly air and small amount of fluid.   Gas in the urinary bladder lumen without wall thickening. Correlate for recent instrumentation; otherwise, cystitis of concern.   Chronic fracture deformities of the right hemipelvis, left ilium status post internal fixation of the left iliac crest..   Hepatic steatosis.   MACRO: None.   Signed by: Yayo Dotson 5/5/2025 10:23 PM Dictation workstation:   [id number]    CT head wo IV contrast  Result Date: 5/5/2025  Interpreted By:  Yayo Dotson, STUDY: CT HEAD WO IV CONTRAST;  5/5/2025 10:00 pm   INDICATION:  Signs/Symptoms:Abdominal pain with nausea vomiting and multiple falls..     COMPARISON: None.   ACCESSION NUMBER(S): [id number]   ORDERING CLINICIAN: BENJY LONGO   TECHNIQUE: Noncontrast axial CT images of head were obtained with coronal and sagittal reconstructed images.   FINDINGS: BRAIN PARENCHYMA: There is age disproportionate cerebral volume loss. No acute intraparenchymal hemorrhage or parenchymal evidence of acute large territory ischemic infarct. Gray-white matter distinction is preserved. No mass-effect.   VENTRICLES and EXTRA-AXIAL SPACES:  No acute extra-axial or intraventricular hemorrhage. No effacement of cerebral sulci. The ventricles and sulci are age-concordant.   PARANASAL SINUSES/MASTOIDS:  No hemorrhage or air-fluid levels within the visualized paranasal sinuses. The mastoids are well aerated.   CALVARIUM/ORBITS:  No acute skull fracture.  The orbits and globes are intact to the extent visualized.   EXTRACRANIAL SOFT TISSUES: No discernible acute abnormality.       No acute intracranial abnormality.   Age-disproportionate cerebral volume loss. Please correlate with any known insults/risk factors.   MACRO: None.   Signed by: Yayo Dotson 5/5/2025 10:18 PM Dictation workstation:   [id number]    XR hip right with pelvis when performed 2 or 3 views  Result Date: 4/18/2025  Interpreted By:  Aldo Malcolm, STUDY: XR HIP RIGHT WITH PELVIS WHEN PERFORMED 2 OR 3 VIEWS   INDICATION: Signs/Symptoms:pain.   COMPARISON: November 20, 2024   ACCESSION NUMBER(S): [id number]   ORDERING CLINICIAN: LUCIANO REID   FINDINGS: Postsurgical changes of bilateral periacetabular osteotomy.   On the right there is no evidence of fracture. The joint space normal.       Postsurgical changes of bilateral periacetabular osteotomies. No acute findings right hip.   Signed by: Aldo Malcolm 4/18/2025 6:59 AM Dictation workstation:   [id number]         Assessment/Plan   The patient is a 39 year old woman with complex  medical histoyr here for evaluation of seizure. Likely secondary to ETOH, would contineu medical therapy for ETOH withdrawal. Imaging unremakrable and EEG pending. Will continue to supportive care when stble and EEG negative can go to nursing facility.    I spent 60 minutes in the professional and overall care of this patient.      Aasef G Shaikh, MD PhD       [1] No past medical history on file.  [2] No past surgical history on file.  [3]   Social History  Tobacco Use    Smoking status: Every Day     Current packs/day: 0.50     Average packs/day: 0.5 packs/day for 25.3 years (12.7 ttl pk-yrs)     Types: Cigarettes     Start date: 2000    Smokeless tobacco: Never   Substance Use Topics    Alcohol use: Yes     Alcohol/week: 20.0 standard drinks of alcohol     Types: 20 Glasses of wine per week    Drug use: Never   [4]   Medications Prior to Admission   Medication Sig Dispense Refill Last Dose/Taking    buPROPion XL (Wellbutrin XL) 150 mg 24 hr tablet Take 2 tablets (300 mg) by mouth once daily.   5/5/2025    metoprolol succinate XL (Toprol-XL) 25 mg 24 hr tablet Take 1 tablet (25 mg) by mouth once daily.   5/5/2025 Morning    multivitamin capsule Take 1 capsule by mouth once daily.   5/5/2025    progesterone (Prometrium) 100 mg capsule Take 1 capsule (100 mg) by mouth.   5/5/2025    traZODone (Desyrel) 50 mg tablet Take 1 tablet (50 mg) by mouth if needed for sleep.   Past Week    vilazodone (Viibryd) 10 mg tablet Take 1 tablet (10 mg) by mouth early in the morning..   5/5/2025    Vyvanse 20 mg capsule Take 1 capsule (20 mg) by mouth once daily in the morning.   5/5/2025    estradiol (Vivelle-DOT) 0.025 mg/24 hr patch Place 1 patch on the skin 2 times a week.       famotidine (Pepcid) 20 mg tablet Take 1 tablet (20 mg) by mouth once daily as needed.

## 2025-05-07 NOTE — DISCHARGE SUMMARY
ADMISSION DATE: 5/5/2025     DISCHARGE DATE:  05/07/25     Discharge Diagnosis  Alcohol withdrawal seizure with complication (Multi)  History of alcohol dependence  Alcohol withdrawal.    Issues Requiring Follow-Up  Patient left AGAINST MEDICAL ADVICE without telling anybody.        Discharge Meds     Your medication list        ASK your doctor about these medications        Instructions Last Dose Given Next Dose Due   buPROPion  mg 24 hr tablet  Commonly known as: Wellbutrin XL           estradiol 0.025 mg/24 hr patch  Commonly known as: Vivelle-DOT           famotidine 20 mg tablet  Commonly known as: Pepcid           metoprolol succinate XL 25 mg 24 hr tablet  Commonly known as: Toprol-XL           multivitamin capsule           progesterone 100 mg capsule  Commonly known as: Prometrium           traZODone 50 mg tablet  Commonly known as: Desyrel           vilazodone 10 mg tablet  Commonly known as: Viibryd           Vyvanse 20 mg capsule  Generic drug: lisdexamfetamine                        Results for orders placed or performed during the hospital encounter of 05/05/25 (from the past 24 hours)   CBC   Result Value Ref Range    WBC 4.6 4.4 - 11.3 x10*3/uL    nRBC 0.0 0.0 - 0.0 /100 WBCs    RBC 3.89 (L) 4.00 - 5.20 x10*6/uL    Hemoglobin 13.0 12.0 - 16.0 g/dL    Hematocrit 38.5 36.0 - 46.0 %    MCV 99 80 - 100 fL    MCH 33.4 26.0 - 34.0 pg    MCHC 33.8 32.0 - 36.0 g/dL    RDW 12.3 11.5 - 14.5 %    Platelets 193 150 - 450 x10*3/uL   Basic Metabolic Panel   Result Value Ref Range    Glucose 107 (H) 74 - 99 mg/dL    Sodium 135 (L) 136 - 145 mmol/L    Potassium 3.5 3.5 - 5.3 mmol/L    Chloride 99 98 - 107 mmol/L    Bicarbonate 21 21 - 32 mmol/L    Anion Gap 19 10 - 20 mmol/L    Urea Nitrogen 7 6 - 23 mg/dL    Creatinine 0.47 (L) 0.50 - 1.05 mg/dL    eGFR >90 >60 mL/min/1.73m*2    Calcium 10.2 8.6 - 10.3 mg/dL    EEG  Result Date: 5/7/2025  IMPRESSION Impression This is a normal awake and drowsy routine EEG. No  epileptiform discharges or lateralizing signs seen. A full report will be scanned into the patient's chart at a later time. This report has been interpreted and electronically signed by    MR brain w and wo IV contrast  Result Date: 5/6/2025  Interpreted By:  Pieter Enamorado, STUDY: MR BRAIN W AND WO IV CONTRAST;  5/6/2025 6:02 pm   INDICATION: Signs/Symptoms:seizure.     COMPARISON: None.   ACCESSION NUMBER(S): [id number]   ORDERING CLINICIAN: ANNI GALLARDO   TECHNIQUE: T2, FLAIR, DWI, ADC, gradient echo T2, and T1 weighted images of brain were acquired without intravenous contrast administration. Multi planar T1 weighted imaging was acquired after administration of 10 ML Dotarem gadolinium based intravenous contrast.   FINDINGS: No intracranial space occupying lesions. No evidence of gray matter heterotopias or other neuronal migration abnormalities. No focal cortical dysplasias.  Normal adult myelination pattern is present. Midline structures including the cerebellar vermis, brainstem, and corpus callosum are well formed.   The hippocampal formations are symmetric, demonstrating normal volume and superior sulcation, without abnormal FLAIR signal is to suggest hippocampal sclerosis.   No intracranial hemorrhage. No extra-axial fluid collection. There is no abnormally restricted diffusion to suggest recent infarction. Major vascular flow voids are present.   No abnormal brain parenchymal or meningeal enhancement.   No paranasal sinus/ethmoid mucosal inflammatory changes. No nonspecific mastoid fluid. Orbital contents unremarkable. No aggressive appearing osseous lesions identified.         1.  Unremarkable brain. No discernible seizure focus.     MACRO: None   Signed by: Pieter Enamorado 5/6/2025 8:17 PM Dictation workstation:   [id number]    ECG 12 lead  Result Date: 5/6/2025  Sinus tachycardia with short AK Incomplete right bundle branch block Borderline ECG No previous ECGs available    CT abdomen  pelvis w IV contrast  Result Date: 5/5/2025  Interpreted By:  Yayo Dotson, STUDY: CT ABDOMEN PELVIS W IV CONTRAST;  5/5/2025 10:00 pm   INDICATION: Signs/Symptoms:Abdominal pain with nausea and vomiting..     COMPARISON: None.   ACCESSION NUMBER(S): [id number]   ORDERING CLINICIAN: BENJY LONGO   TECHNIQUE: Contiguous axial images of the abdomen and pelvis were obtained after the intravenous administration of iodinated contrast. Coronal and sagittal reformatted images were reconstructed from the axial data.   FINDINGS: LOWER CHEST: No acute abnormality. Subsegmental bibasilar atelectasis.     ABDOMEN/PELVIS:   ABDOMINAL WALL: No significant abnormality.   LIVER: Diffuse hypoattenuation of the liver consistent with steatosis.   BILE DUCTS: No suspicious lesions.   GALLBLADDER: No significant abnormality.   PANCREAS: No significant abnormality.   SPLEEN: No significant abnormality.   ADRENALS: No significant abnormality.   KIDNEYS, URETERS, BLADDER: Kidneys are normal in size and enhancement. No hydronephrosis. The urinary bladder wall is normal in thickness. There gas in the urinary bladder lumen.   REPRODUCTIVE ORGANS: No significant abnormality.   VESSELS: No significant abnormality.   RETROPERITONEUM/LYMPH NODES: No acute retroperitoneal abnormality. No enlarged lymph nodes.   BOWEL/PERITONEUM: Stomach is moderately distended with predominantly air in small amount of fluid. No inflammatory bowel wall thickening or dilatation. Normal appendix.   No ascites, free air, or fluid collection.     MUSCULOSKELETAL: No acute osseous abnormality. Chronic fracture deformity of the right hemipelvis involving the right iliopectineal junction, superior and posterior acetabulum. There is internal fixation of the left iliac crest without evidence of hardware complication. There postsurgical changes of L5-S1 posterior fusion and L5 laminectomy and discectomy at L5-S1. There is a right paracentral calcified disc extrusion  that does not appear to directly affected descending right S1 nerve root. No suspicious osseous lesion.       Nonspecific moderate distention of the stomach with predominantly air and small amount of fluid.   Gas in the urinary bladder lumen without wall thickening. Correlate for recent instrumentation; otherwise, cystitis of concern.   Chronic fracture deformities of the right hemipelvis, left ilium status post internal fixation of the left iliac crest..   Hepatic steatosis.   MACRO: None.   Signed by: Yayo Dotson 5/5/2025 10:23 PM Dictation workstation:   [id number]    CT head wo IV contrast  Result Date: 5/5/2025  Interpreted By:  Yayo Dotson, STUDY: CT HEAD WO IV CONTRAST;  5/5/2025 10:00 pm   INDICATION: Signs/Symptoms:Abdominal pain with nausea vomiting and multiple falls..     COMPARISON: None.   ACCESSION NUMBER(S): [id number]   ORDERING CLINICIAN: BENJY LONGO   TECHNIQUE: Noncontrast axial CT images of head were obtained with coronal and sagittal reconstructed images.   FINDINGS: BRAIN PARENCHYMA: There is age disproportionate cerebral volume loss. No acute intraparenchymal hemorrhage or parenchymal evidence of acute large territory ischemic infarct. Gray-white matter distinction is preserved. No mass-effect.   VENTRICLES and EXTRA-AXIAL SPACES:  No acute extra-axial or intraventricular hemorrhage. No effacement of cerebral sulci. The ventricles and sulci are age-concordant.   PARANASAL SINUSES/MASTOIDS:  No hemorrhage or air-fluid levels within the visualized paranasal sinuses. The mastoids are well aerated.   CALVARIUM/ORBITS:  No acute skull fracture.  The orbits and globes are intact to the extent visualized.   EXTRACRANIAL SOFT TISSUES: No discernible acute abnormality.       No acute intracranial abnormality.   Age-disproportionate cerebral volume loss. Please correlate with any known insults/risk factors.   MACRO: None.   Signed by: Yayo Dotson 5/5/2025 10:18 PM Dictation  workstation:   [id number]    XR hip right with pelvis when performed 2 or 3 views  Result Date: 4/18/2025  Interpreted By:  Aldo Malcolm, STUDY: XR HIP RIGHT WITH PELVIS WHEN PERFORMED 2 OR 3 VIEWS   INDICATION: Signs/Symptoms:pain.   COMPARISON: November 20, 2024   ACCESSION NUMBER(S): [id number]   ORDERING CLINICIAN: LUCIANO REID   FINDINGS: Postsurgical changes of bilateral periacetabular osteotomy.   On the right there is no evidence of fracture. The joint space normal.       Postsurgical changes of bilateral periacetabular osteotomies. No acute findings right hip.   Signed by: Aldo Malcolm 4/18/2025 6:59 AM Dictation workstation:   [id number]           Hospital Course   33-year-old female with history of alcohol dependence was admitted with alcohol withdrawal and tremulousness and possible alcohol withdrawal seizure.  Her serum lactate level was elevated on admission which improved after hydration.  She was admitted to medical floor and monitored closely and was started on alcohol withdrawal protocol and tapering doses of phenobarbital.  She was also hydrated with IV fluids and serum electrolytes were corrected.  Patient was counseled about cessation of alcohol and seeking help.  Patient did see neurology and had an EEG and MRI of the brain which was negative for for any pathology.  However this morning before I made rounds patient had left AGAINST MEDICAL ADVICE without telling anybody on the floor.  As a matter fact she had an IV Hep-Lock is still in the arm when she left.        Pertinent Physical Exam At Time of Discharge  Patient was not examined today since she had left AGAINST MEDICAL ADVICE earlier before I made rounds.  Brief examination as of admission on May 6, 2025.  Is as follows  GENERAL:  Alert, no distress, cooperative, afebrile  SKIN:  Skin color, texture, turgor normal. No rashes or lesions.  OROPHARYNX:  Lips, mucosa, and tongue are normal.Teeth and gums, normal. Oropharynx  "normal.  NECK:  No jugulovenous distention, No carotid bruits, Carotid pulse normal contour, Supple  LUNGS:  Lungs clear to auscultation. Good diaphragmatic excursion.  CARDIAC: Rate and rhythm regular, normal S1 and S2; no rubs, murmurs, or gallops  ABDOMEN:  Abdomen soft, non-tender, BS normal, No masses or organomegaly  EXTREMETIES:  Extremities normal, no deformities, edema, clubbing or skin discoloration. Good capillary refill., No ulcers  NEURO:  Alert, oriented X 3,  Non-focal.   PULSES:  2+ radial, 2+ carotid  Visit Vitals  BP (!) 140/103 (BP Location: Left arm, Patient Position: Sitting) Comment: checled twice/both arms/ right arm was 152/109   Pulse 89   Temp 36.8 °C (98.2 °F) (Temporal)   Resp 19   Ht 1.626 m (5' 4\")   Wt 56.7 kg (125 lb)   SpO2 97%   BMI 21.46 kg/m²   OB Status Unknown   Smoking Status Every Day   BSA 1.6 m²        Outpatient Follow-Up  Future Appointments   Date Time Provider Department [place]   5/14/2025  8:30 AM Tay Dewey MD [id number] [facility]   5/15/2025  8:00 AM [facility] 2 [facility]   5/15/2025  8:45 AM [facility]   5/15/2025  9:00 AM [facility] FLUORO 2 [facility]   5/15/2025  9:45 AM U MOBILE MRI [facility]         Gregorio Remy MD  "

## 2025-05-07 NOTE — CARE PLAN
The patient's goals for the shift include      The clinical goals for the shift include To be free from fall/injury

## 2025-05-07 NOTE — NURSING NOTE
"Patient refusing to have bed alarm because she \"wants to sit on couch by window\". RN asked for her to sit in chair with charm alar. Patient still refusing. Stated she \"is ready to go home\".  Education provided but patient still refusing. Patient already dressed for discharge and is wearing her own slip on sandals.  "

## 2025-05-07 NOTE — NURSING NOTE
"Pt refusing bed alarm. Pt states \" I want to be able to sit on the couch and read and stretch and the bed alarm is so jarring every time I want to get up. The seizure was R/O, I fell at my boyfriend's house not here. I really don't want to have to sit in the bed. I'd like to be free to move around. I'm not saying the something bad didn't happen but I feel much better. Pt agreed to wear non-slip socks while walking in the room. Pt educated that bed alarm is just for her safety.  "

## 2025-05-09 LAB
ATRIAL RATE: 109 BPM
BACTERIA UR CULT: ABNORMAL
BACTERIA UR CULT: ABNORMAL
P AXIS: 63 DEGREES
P OFFSET: 217 MS
P ONSET: 177 MS
PR INTERVAL: 92 MS
Q ONSET: 223 MS
QRS COUNT: 17 BEATS
QRS DURATION: 92 MS
QT INTERVAL: 322 MS
QTC CALCULATION(BAZETT): 433 MS
QTC FREDERICIA: 392 MS
R AXIS: 7 DEGREES
T AXIS: 48 DEGREES
T OFFSET: 384 MS
VENTRICULAR RATE: 109 BPM

## 2025-05-14 ENCOUNTER — APPOINTMENT (OUTPATIENT)
Dept: ORTHOPEDIC SURGERY | Facility: CLINIC | Age: 40
End: 2025-05-14
Payer: COMMERCIAL

## 2025-05-15 ENCOUNTER — HOSPITAL ENCOUNTER (OUTPATIENT)
Dept: RADIOLOGY | Facility: HOSPITAL | Age: 40
Discharge: HOME | End: 2025-05-15
Payer: COMMERCIAL

## 2025-05-15 DIAGNOSIS — M25.852 FEMOROACETABULAR IMPINGEMENT OF BOTH HIPS: ICD-10-CM

## 2025-05-15 DIAGNOSIS — M25.851 FEMOROACETABULAR IMPINGEMENT OF BOTH HIPS: ICD-10-CM

## 2025-05-15 PROCEDURE — 2500000004 HC RX 250 GENERAL PHARMACY W/ HCPCS (ALT 636 FOR OP/ED): Performed by: NURSE PRACTITIONER

## 2025-05-15 PROCEDURE — 2550000001 HC RX 255 CONTRASTS: Mod: JW | Performed by: NURSE PRACTITIONER

## 2025-05-15 PROCEDURE — 2500000004 HC RX 250 GENERAL PHARMACY W/ HCPCS (ALT 636 FOR OP/ED)

## 2025-05-15 PROCEDURE — 2550000001 HC RX 255 CONTRASTS: Performed by: NURSE PRACTITIONER

## 2025-05-15 PROCEDURE — 73525 CONTRAST X-RAY OF HIP: CPT | Mod: LT

## 2025-05-15 PROCEDURE — A9575 INJ GADOTERATE MEGLUMI 0.1ML: HCPCS | Mod: JW | Performed by: NURSE PRACTITIONER

## 2025-05-15 PROCEDURE — 73525 CONTRAST X-RAY OF HIP: CPT | Mod: RT

## 2025-05-15 PROCEDURE — 73722 MRI JOINT OF LWR EXTR W/DYE: CPT | Mod: LT

## 2025-05-15 PROCEDURE — 73722 MRI JOINT OF LWR EXTR W/DYE: CPT | Mod: RT

## 2025-05-15 RX ORDER — GADOTERATE MEGLUMINE 376.9 MG/ML
0.1 INJECTION INTRAVENOUS
Status: COMPLETED | OUTPATIENT
Start: 2025-05-15 | End: 2025-05-15

## 2025-05-15 RX ORDER — LIDOCAINE HYDROCHLORIDE 10 MG/ML
15 INJECTION, SOLUTION INFILTRATION; PERINEURAL ONCE
Status: COMPLETED | OUTPATIENT
Start: 2025-05-15 | End: 2025-05-15

## 2025-05-15 RX ORDER — LIDOCAINE HYDROCHLORIDE 10 MG/ML
15 INJECTION, SOLUTION EPIDURAL; INFILTRATION; INTRACAUDAL; PERINEURAL ONCE
Status: CANCELLED | OUTPATIENT
Start: 2025-05-15 | End: 2025-05-15

## 2025-05-15 RX ORDER — LIDOCAINE HYDROCHLORIDE 10 MG/ML
INJECTION, SOLUTION INFILTRATION; PERINEURAL
Status: COMPLETED
Start: 2025-05-15 | End: 2025-05-15

## 2025-05-15 RX ORDER — LIDOCAINE HYDROCHLORIDE 10 MG/ML
15 INJECTION, SOLUTION INFILTRATION; PERINEURAL ONCE
Status: DISCONTINUED | OUTPATIENT
Start: 2025-05-15 | End: 2025-05-16 | Stop reason: HOSPADM

## 2025-05-15 RX ADMIN — IOHEXOL 5 ML: 240 INJECTION, SOLUTION INTRATHECAL; INTRAVASCULAR; INTRAVENOUS; ORAL at 09:22

## 2025-05-15 RX ADMIN — LIDOCAINE HYDROCHLORIDE 15 ML: 10 INJECTION, SOLUTION INFILTRATION; PERINEURAL at 09:38

## 2025-05-15 RX ADMIN — GADOTERATE MEGLUMINE 0.1 ML: 376.9 INJECTION INTRAVENOUS at 09:28

## 2025-05-15 RX ADMIN — GADOTERATE MEGLUMINE 0.1 ML: 376.9 INJECTION INTRAVENOUS at 09:21

## 2025-05-15 RX ADMIN — LIDOCAINE HYDROCHLORIDE 15 ML: 10 INJECTION, SOLUTION INFILTRATION; PERINEURAL at 09:21

## 2025-05-15 RX ADMIN — IOHEXOL 5 ML: 240 INJECTION, SOLUTION INTRATHECAL; INTRAVASCULAR; INTRAVENOUS; ORAL at 09:29

## 2025-05-15 NOTE — PRE-PROCEDURE NOTE
Interventional Radiology Preprocedure Note    Indication for procedure: The encounter diagnosis was Femoroacetabular impingement of both hips. Hx Freddy Danlos    Relevant review of systems: bilateral hip pain.    Relevant Labs:   Lab Results   Component Value Date    CREATININE 0.47 (L) 05/07/2025    EGFR >90 05/07/2025       Planned Sedation/Anesthesia: local    Airway assessment: normal    Directed physical examination:    A&Ox3  Breathing Unlabored  Ambulates without assistive devices. Well healed scar left anterior hip.     Plan for bilateral fl guided intraarticular hip injections today for MR arthrogram.     Benefits, risks and alternatives of procedure and planned sedation have been discussed with the patient and/or their representative. All questions answered and they agree to proceed.

## 2025-05-15 NOTE — POST-PROCEDURE NOTE
Interventional Radiology Brief Postprocedure Note    Attending: Reyna Hull CNP    Assistant: none    Diagnosis: Right hip pain    Description of procedure: right intraarticular hip injection under direct fluoroscopic guidance.   7ml lidocaine subQ   10ml of a solution of 1% lidocaine, Omnipaque, and gadolinium     Anesthesia:  Local    Complications: None    Estimated Blood Loss: minimal    Specimens: No    See detailed result report with images in PACS.    The patient tolerated the procedure well without incident or complication and is in stable condition.

## 2025-05-15 NOTE — POST-PROCEDURE NOTE
Interventional Radiology Brief Postprocedure Note    Attending: Reyna Hull CNP    Assistant: none    Diagnosis: Left hip pain    Description of procedure: Left intraarticular hip injection under direct fluoroscopic guidance.   7ml lidocaine subQ   10ml of a solution of 1% lidocaine, Omnipaque, and gadolinium     Anesthesia:  Local    Complications: None    Estimated Blood Loss: minimal    Specimens: No    See detailed result report with images in PACS.    The patient tolerated the procedure well without incident or complication and is in stable condition.

## 2025-05-23 ENCOUNTER — APPOINTMENT (OUTPATIENT)
Dept: ORTHOPEDIC SURGERY | Facility: HOSPITAL | Age: 40
End: 2025-05-23
Payer: COMMERCIAL

## 2025-05-30 ENCOUNTER — APPOINTMENT (OUTPATIENT)
Dept: ORTHOPEDIC SURGERY | Facility: HOSPITAL | Age: 40
End: 2025-05-30
Payer: COMMERCIAL

## 2025-06-02 ENCOUNTER — APPOINTMENT (OUTPATIENT)
Dept: RADIOLOGY | Facility: HOSPITAL | Age: 40
End: 2025-06-02
Payer: COMMERCIAL

## 2025-06-02 ENCOUNTER — APPOINTMENT (OUTPATIENT)
Dept: ORTHOPEDIC SURGERY | Facility: HOSPITAL | Age: 40
End: 2025-06-02
Payer: COMMERCIAL

## 2025-06-02 DIAGNOSIS — M25.551 BILATERAL HIP PAIN: Primary | ICD-10-CM

## 2025-06-02 DIAGNOSIS — M25.552 BILATERAL HIP PAIN: Primary | ICD-10-CM

## 2025-06-16 ENCOUNTER — OFFICE VISIT (OUTPATIENT)
Dept: ORTHOPEDIC SURGERY | Facility: HOSPITAL | Age: 40
End: 2025-06-16
Payer: COMMERCIAL

## 2025-06-16 ENCOUNTER — HOSPITAL ENCOUNTER (OUTPATIENT)
Dept: RADIOLOGY | Facility: HOSPITAL | Age: 40
Discharge: HOME | End: 2025-06-16
Payer: COMMERCIAL

## 2025-06-16 DIAGNOSIS — M25.851 FEMOROACETABULAR IMPINGEMENT OF BOTH HIPS: Primary | ICD-10-CM

## 2025-06-16 DIAGNOSIS — M25.552 BILATERAL HIP PAIN: ICD-10-CM

## 2025-06-16 DIAGNOSIS — M25.551 BILATERAL HIP PAIN: ICD-10-CM

## 2025-06-16 DIAGNOSIS — M25.852 FEMOROACETABULAR IMPINGEMENT OF BOTH HIPS: Primary | ICD-10-CM

## 2025-06-16 PROCEDURE — 73523 X-RAY EXAM HIPS BI 5/> VIEWS: CPT

## 2025-06-16 PROCEDURE — 99213 OFFICE O/P EST LOW 20 MIN: CPT | Performed by: ORTHOPAEDIC SURGERY

## 2025-06-16 PROCEDURE — 99212 OFFICE O/P EST SF 10 MIN: CPT | Mod: 25 | Performed by: ORTHOPAEDIC SURGERY

## 2025-06-16 PROCEDURE — 73523 X-RAY EXAM HIPS BI 5/> VIEWS: CPT | Mod: BILATERAL PROCEDURE | Performed by: STUDENT IN AN ORGANIZED HEALTH CARE EDUCATION/TRAINING PROGRAM

## 2025-06-16 RX ORDER — BUPROPION HYDROCHLORIDE 300 MG/1
300 TABLET ORAL
COMMUNITY
Start: 2025-05-08

## 2025-06-16 RX ORDER — CLONIDINE HYDROCHLORIDE 0.1 MG/1
1 TABLET ORAL EVERY 6 HOURS
COMMUNITY
Start: 2024-09-09

## 2025-06-16 RX ORDER — ONDANSETRON 4 MG/1
TABLET, FILM COATED ORAL
COMMUNITY
Start: 2024-09-09

## 2025-06-16 RX ORDER — LEVETIRACETAM 750 MG/1
TABLET ORAL
COMMUNITY
Start: 2024-09-09

## 2025-06-16 RX ORDER — HYDROXYZINE HYDROCHLORIDE 10 MG/1
TABLET, FILM COATED ORAL
COMMUNITY
Start: 2025-05-08

## 2025-06-16 RX ORDER — METHYLPREDNISOLONE 4 MG/1
TABLET ORAL
COMMUNITY
Start: 2025-06-10

## 2025-06-16 RX ORDER — DULOXETIN HYDROCHLORIDE 20 MG/1
40 CAPSULE, DELAYED RELEASE ORAL
COMMUNITY
Start: 2024-08-28

## 2025-06-16 RX ORDER — ACYCLOVIR 400 MG/1
1 TABLET ORAL
COMMUNITY
Start: 2024-10-16

## 2025-06-16 RX ORDER — NALTREXONE HYDROCHLORIDE 50 MG/1
50 TABLET, FILM COATED ORAL NIGHTLY
COMMUNITY

## 2025-06-16 RX ORDER — TRAMADOL HYDROCHLORIDE 50 MG/1
TABLET, FILM COATED ORAL
COMMUNITY
Start: 2025-06-10

## 2025-06-16 RX ORDER — NAPROXEN 250 MG/1
250 TABLET ORAL
COMMUNITY

## 2025-06-16 RX ORDER — NORETHINDRONE 5 MG/1
1 TABLET ORAL
COMMUNITY
Start: 2024-09-25

## 2025-06-16 RX ORDER — METHOCARBAMOL 750 MG/1
1 TABLET, FILM COATED ORAL
COMMUNITY
Start: 2025-04-15

## 2025-06-16 RX ORDER — FLUCONAZOLE 150 MG/1
1 TABLET ORAL
COMMUNITY
Start: 2024-07-17

## 2025-06-16 RX ORDER — LORAZEPAM 0.5 MG/1
TABLET ORAL
COMMUNITY
Start: 2024-07-15

## 2025-06-16 RX ORDER — ACETAMINOPHEN 500 MG
TABLET ORAL
COMMUNITY
Start: 2024-07-06

## 2025-06-16 ASSESSMENT — PAIN SCALES - GENERAL: PAINLEVEL_OUTOF10: 6

## 2025-06-16 ASSESSMENT — PAIN - FUNCTIONAL ASSESSMENT: PAIN_FUNCTIONAL_ASSESSMENT: 0-10

## 2025-06-16 NOTE — PROGRESS NOTES
Patient returns to see me today she has had numerous operative interventions on her hip.  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  Unfortunately I think that her options are very limited from a surgical perspective I do think that she has low back issues and hip trouble I do think that a diagnostic injection could be helpful to understand how much of her pain is coming from her hip and how much relief she would get from that certainly if she got relief from that then that amount of discomfort might be helped and I think the most reliable surgical intervention for her would be some kind of a hip replacement whether it to be an anterior posterior approach I think remains to be determined we will get her set up to do a diagnostic injection and see how she does from that I do think that the relief that she will get is going to be incomplete so we will have her journal her results

## 2025-06-18 ENCOUNTER — HOSPITAL ENCOUNTER (OUTPATIENT)
Dept: RADIOLOGY | Facility: EXTERNAL LOCATION | Age: 40
Discharge: HOME | End: 2025-06-18

## 2025-06-18 ENCOUNTER — OFFICE VISIT (OUTPATIENT)
Dept: ORTHOPEDIC SURGERY | Facility: HOSPITAL | Age: 40
End: 2025-06-18
Payer: COMMERCIAL

## 2025-06-18 DIAGNOSIS — M25.551 PAIN IN RIGHT HIP: Primary | ICD-10-CM

## 2025-06-18 DIAGNOSIS — M25.551 PAIN IN RIGHT HIP: ICD-10-CM

## 2025-06-18 PROCEDURE — 2500000004 HC RX 250 GENERAL PHARMACY W/ HCPCS (ALT 636 FOR OP/ED): Performed by: PHYSICIAN ASSISTANT

## 2025-06-18 PROCEDURE — 99211 OFF/OP EST MAY X REQ PHY/QHP: CPT | Mod: 25 | Performed by: ORTHOPAEDIC SURGERY

## 2025-06-18 PROCEDURE — 99213 OFFICE O/P EST LOW 20 MIN: CPT | Performed by: PHYSICIAN ASSISTANT

## 2025-06-18 PROCEDURE — 99213 OFFICE O/P EST LOW 20 MIN: CPT | Performed by: ORTHOPAEDIC SURGERY

## 2025-06-18 PROCEDURE — 20611 DRAIN/INJ JOINT/BURSA W/US: CPT | Mod: RT | Performed by: PHYSICIAN ASSISTANT

## 2025-06-18 RX ORDER — LIDOCAINE HYDROCHLORIDE 10 MG/ML
5 INJECTION, SOLUTION INFILTRATION; PERINEURAL
Status: COMPLETED | OUTPATIENT
Start: 2025-06-18 | End: 2025-06-18

## 2025-06-18 RX ADMIN — LIDOCAINE HYDROCHLORIDE 5 ML: 10 INJECTION, SOLUTION INFILTRATION; PERINEURAL at 13:38

## 2025-06-18 ASSESSMENT — PAIN - FUNCTIONAL ASSESSMENT: PAIN_FUNCTIONAL_ASSESSMENT: 0-10

## 2025-06-18 ASSESSMENT — PAIN SCALES - GENERAL: PAINLEVEL_OUTOF10: 6

## 2025-06-18 NOTE — PROGRESS NOTES
Patient is here today regarding right hip pain.  She has seen Dr. 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  We proceeded as below        L Inj/Asp: R hip joint on 6/18/2025 1:38 PM  Details: 20 G needle, ultrasound-guided anterior approach  Medications: 5 mL lidocaine 10 mg/mL (1 %)  Outcome: tolerated well, no immediate complications  Procedure, treatment alternatives, risks and benefits explained, specific risks discussed. Immediately prior to procedure a time out was called to verify the correct patient, procedure, equipment, support staff and site/side marked as required. Patient was prepped and draped in the usual sterile fashion.          She thought she had some initial relief but after further questioning did not notice much effect from the injection.  See Dr. Liz note for further details.      Dania Magallon PA-C

## 2025-07-02 NOTE — PROGRESS NOTES
Patient underwent an injection today and did not get a tremendous amount of relief and so I would suggest she continue along non-surgical means to try to help her pain    I do not think further arthroscopic surgery would benefit this patient.  Plan to see her back as needed only